# Patient Record
Sex: MALE | Race: WHITE | Employment: OTHER | URBAN - METROPOLITAN AREA
[De-identification: names, ages, dates, MRNs, and addresses within clinical notes are randomized per-mention and may not be internally consistent; named-entity substitution may affect disease eponyms.]

---

## 2016-02-02 LAB — DIABETIC RETINOPATHY: NORMAL

## 2017-07-03 LAB
ALBUMIN SERPL-MCNC: 4.3 G/DL
ALP BLD-CCNC: 60 U/L
ALT SERPL-CCNC: 38 U/L
ANION GAP SERPL CALCULATED.3IONS-SCNC: 1.6 MMOL/L
AST SERPL-CCNC: 33 U/L
AVERAGE GLUCOSE: NORMAL
BASOPHILS ABSOLUTE: NORMAL /ΜL
BASOPHILS RELATIVE PERCENT: NORMAL %
BILIRUB SERPL-MCNC: 0.7 MG/DL (ref 0.1–1.4)
BILIRUBIN, URINE: NORMAL
BLOOD, URINE: NORMAL
BUN BLDV-MCNC: 21 MG/DL
CALCIUM SERPL-MCNC: 9.6 MG/DL
CHLORIDE BLD-SCNC: 103 MMOL/L
CHOLESTEROL, TOTAL: 182 MG/DL
CHOLESTEROL/HDL RATIO: 4.3
CLARITY: NORMAL
CO2: 32 MMOL/L
COLOR: NORMAL
CREAT SERPL-MCNC: 1.17 MG/DL
EOSINOPHILS ABSOLUTE: NORMAL /ΜL
EOSINOPHILS RELATIVE PERCENT: NORMAL %
GFR CALCULATED: NORMAL
GLUCOSE BLD-MCNC: 82 MG/DL
GLUCOSE URINE: NORMAL
HBA1C MFR BLD: 5.7 %
HCT VFR BLD CALC: NORMAL % (ref 41–53)
HDLC SERPL-MCNC: 42 MG/DL (ref 35–70)
HEMOGLOBIN: NORMAL G/DL (ref 13.5–17.5)
KETONES, URINE: NORMAL
LDL CHOLESTEROL CALCULATED: 124 MG/DL (ref 0–160)
LEUKOCYTE ESTERASE, URINE: NORMAL
LYMPHOCYTES ABSOLUTE: NORMAL /ΜL
LYMPHOCYTES RELATIVE PERCENT: NORMAL %
MCH RBC QN AUTO: NORMAL PG
MCHC RBC AUTO-ENTMCNC: NORMAL G/DL
MCV RBC AUTO: NORMAL FL
MONOCYTES ABSOLUTE: NORMAL /ΜL
MONOCYTES RELATIVE PERCENT: NORMAL %
NEUTROPHILS ABSOLUTE: NORMAL /ΜL
NEUTROPHILS RELATIVE PERCENT: NORMAL %
NITRITE, URINE: NORMAL
PDW BLD-RTO: NORMAL %
PH UA: NORMAL (ref 4.5–8)
PLATELET # BLD: NORMAL K/ΜL
PMV BLD AUTO: NORMAL FL
POTASSIUM SERPL-SCNC: 5.9 MMOL/L
PROTEIN UA: NORMAL
RBC # BLD: NORMAL 10^6/ΜL
SODIUM BLD-SCNC: 142 MMOL/L
SPECIFIC GRAVITY, URINE: NORMAL
TESTOSTERONE TOTAL: 543
TOTAL PROTEIN: 6.9
TRIGL SERPL-MCNC: 81 MG/DL
TSH SERPL DL<=0.05 MIU/L-ACNC: 0.38 UIU/ML
URIC ACID: 5.6
UROBILINOGEN, URINE: NORMAL
VITAMIN D 25-HYDROXY: NORMAL
VITAMIN D2, 25 HYDROXY: NORMAL
VITAMIN D3,25 HYDROXY: NORMAL
VLDLC SERPL CALC-MCNC: NORMAL MG/DL
WBC # BLD: NORMAL 10^3/ML

## 2018-01-03 LAB
ALBUMIN SERPL-MCNC: 4.2 G/DL
ALP BLD-CCNC: 53 U/L
ALT SERPL-CCNC: 26 U/L
ANION GAP SERPL CALCULATED.3IONS-SCNC: 1.6 MMOL/L
AST SERPL-CCNC: 24 U/L
AVERAGE GLUCOSE: NORMAL
BASOPHILS ABSOLUTE: 50 /ΜL
BASOPHILS RELATIVE PERCENT: 1 %
BILIRUB SERPL-MCNC: 0.6 MG/DL (ref 0.1–1.4)
BILIRUBIN, URINE: NORMAL
BLOOD, URINE: NORMAL
BUN BLDV-MCNC: 23 MG/DL
CALCIUM SERPL-MCNC: 9.3 MG/DL
CHLORIDE BLD-SCNC: 103 MMOL/L
CHOLESTEROL, TOTAL: 219 MG/DL
CHOLESTEROL/HDL RATIO: 4.6
CLARITY: NORMAL
CO2: 28 MMOL/L
COLOR: NORMAL
CREAT SERPL-MCNC: 1.28 MG/DL
EOSINOPHILS ABSOLUTE: 590 /ΜL
EOSINOPHILS RELATIVE PERCENT: 6 %
GFR CALCULATED: NORMAL
GLUCOSE BLD-MCNC: 94 MG/DL
GLUCOSE URINE: NORMAL
HBA1C MFR BLD: 5.6 %
HCT VFR BLD CALC: 53.2 % (ref 41–53)
HDLC SERPL-MCNC: 48 MG/DL (ref 35–70)
HEMOGLOBIN: 17 G/DL (ref 13.5–17.5)
KETONES, URINE: NORMAL
LDL CHOLESTEROL CALCULATED: 146 MG/DL (ref 0–160)
LEUKOCYTE ESTERASE, URINE: NORMAL
LYMPHOCYTES ABSOLUTE: 1870 /ΜL
LYMPHOCYTES RELATIVE PERCENT: 19 %
MCH RBC QN AUTO: 29.8 PG
MCHC RBC AUTO-ENTMCNC: 32 G/DL
MCV RBC AUTO: 93.3 FL
MONOCYTES ABSOLUTE: 840 /ΜL
MONOCYTES RELATIVE PERCENT: 8 %
NEUTROPHILS ABSOLUTE: 6540 /ΜL
NEUTROPHILS RELATIVE PERCENT: 66 %
NITRITE, URINE: NORMAL
PDW BLD-RTO: 14.2 %
PH UA: NORMAL (ref 4.5–8)
PLATELET # BLD: 224 K/ΜL
PMV BLD AUTO: 9.3 FL
POTASSIUM SERPL-SCNC: 5.3 MMOL/L
PROTEIN UA: NORMAL
RBC # BLD: 5.7 10^6/ΜL
SODIUM BLD-SCNC: 139 MMOL/L
SPECIFIC GRAVITY, URINE: NORMAL
TESTOSTERONE TOTAL: 273
TOTAL PROTEIN: 6.7
TRIGL SERPL-MCNC: 128 MG/DL
TSH SERPL DL<=0.05 MIU/L-ACNC: 0.25 UIU/ML
URIC ACID: 23
UROBILINOGEN, URINE: NORMAL
VITAMIN D 25-HYDROXY: 30
VITAMIN D2, 25 HYDROXY: NORMAL
VITAMIN D3,25 HYDROXY: NORMAL
VLDLC SERPL CALC-MCNC: NORMAL MG/DL
WBC # BLD: 9.9 10^3/ML

## 2018-06-14 RX ORDER — ATOMOXETINE 40 MG/1
CAPSULE ORAL
Qty: 180 CAPSULE | Refills: 1 | Status: SHIPPED | OUTPATIENT
Start: 2018-06-14 | End: 2018-07-20 | Stop reason: SDUPTHER

## 2018-06-14 RX ORDER — CANAGLIFLOZIN 300 MG/1
TABLET, FILM COATED ORAL
Qty: 90 TABLET | Refills: 2 | Status: SHIPPED | OUTPATIENT
Start: 2018-06-14 | End: 2018-07-20 | Stop reason: SDUPTHER

## 2018-06-18 RX ORDER — LEVOTHYROXINE SODIUM 137 MCG
TABLET ORAL
Qty: 90 TABLET | Refills: 1 | Status: SHIPPED | OUTPATIENT
Start: 2018-06-18 | End: 2018-07-20 | Stop reason: SDUPTHER

## 2018-06-26 ENCOUNTER — TELEPHONE (OUTPATIENT)
Dept: PRIMARY CARE CLINIC | Age: 73
End: 2018-06-26

## 2018-07-10 DIAGNOSIS — E29.1 HYPOGONADISM IN MALE: ICD-10-CM

## 2018-07-10 DIAGNOSIS — E55.9 VITAMIN D DEFICIENCY: ICD-10-CM

## 2018-07-13 LAB
CREATININE, URINE: 172
MICROALBUMIN/CREAT 24H UR: 0.4 MG/G{CREAT}
MICROALBUMIN/CREAT UR-RTO: 2.3

## 2018-07-14 LAB
AVERAGE GLUCOSE: NORMAL
HBA1C MFR BLD: 5.9 %

## 2018-07-16 LAB
ALBUMIN SERPL-MCNC: NORMAL G/DL
ALP BLD-CCNC: NORMAL U/L
ALT SERPL-CCNC: NORMAL U/L
ANION GAP SERPL CALCULATED.3IONS-SCNC: NORMAL MMOL/L
AST SERPL-CCNC: NORMAL U/L
BILIRUB SERPL-MCNC: NORMAL MG/DL (ref 0.1–1.4)
BUN BLDV-MCNC: NORMAL MG/DL
CALCIUM SERPL-MCNC: NORMAL MG/DL
CHLORIDE BLD-SCNC: NORMAL MMOL/L
CHOLESTEROL, TOTAL: 194 MG/DL
CHOLESTEROL/HDL RATIO: 4.5
CO2: NORMAL MMOL/L
CREAT SERPL-MCNC: 1.27 MG/DL
GFR CALCULATED: NORMAL
GLUCOSE BLD-MCNC: NORMAL MG/DL
HDLC SERPL-MCNC: 43 MG/DL (ref 35–70)
LDL CHOLESTEROL CALCULATED: 129 MG/DL (ref 0–160)
POTASSIUM SERPL-SCNC: 5.8 MMOL/L
SODIUM BLD-SCNC: NORMAL MMOL/L
TESTOSTERONE TOTAL: 396
TOTAL PROTEIN: NORMAL
TRIGL SERPL-MCNC: 114 MG/DL
TSH SERPL DL<=0.05 MIU/L-ACNC: 0.16 UIU/ML
VITAMIN D 25-HYDROXY: 36
VITAMIN D2, 25 HYDROXY: NORMAL
VITAMIN D3,25 HYDROXY: NORMAL
VLDLC SERPL CALC-MCNC: NORMAL MG/DL

## 2018-07-20 ENCOUNTER — OFFICE VISIT (OUTPATIENT)
Dept: PRIMARY CARE CLINIC | Age: 73
End: 2018-07-20
Payer: MEDICARE

## 2018-07-20 VITALS
RESPIRATION RATE: 18 BRPM | HEART RATE: 86 BPM | OXYGEN SATURATION: 93 % | SYSTOLIC BLOOD PRESSURE: 112 MMHG | DIASTOLIC BLOOD PRESSURE: 72 MMHG | WEIGHT: 189 LBS | HEIGHT: 72 IN | BODY MASS INDEX: 25.6 KG/M2

## 2018-07-20 DIAGNOSIS — Z80.51 FHX: RENAL CELL CARCINOMA: ICD-10-CM

## 2018-07-20 DIAGNOSIS — Z83.3 FHX: DIABETES MELLITUS: ICD-10-CM

## 2018-07-20 DIAGNOSIS — Z83.49 FHX: THYROID DISEASE: ICD-10-CM

## 2018-07-20 DIAGNOSIS — E87.5 HYPERKALEMIA: ICD-10-CM

## 2018-07-20 DIAGNOSIS — D75.1 POLYCYTHEMIA: ICD-10-CM

## 2018-07-20 DIAGNOSIS — R73.9 HYPERGLYCEMIA: ICD-10-CM

## 2018-07-20 DIAGNOSIS — E29.1 HYPOGONADISM IN MALE: ICD-10-CM

## 2018-07-20 DIAGNOSIS — E88.81 METABOLIC SYNDROME: ICD-10-CM

## 2018-07-20 DIAGNOSIS — I10 ESSENTIAL HYPERTENSION: ICD-10-CM

## 2018-07-20 DIAGNOSIS — K21.00 REFLUX ESOPHAGITIS: ICD-10-CM

## 2018-07-20 DIAGNOSIS — F90.0 ATTENTION DEFICIT HYPERACTIVITY DISORDER (ADHD), PREDOMINANTLY INATTENTIVE TYPE: ICD-10-CM

## 2018-07-20 DIAGNOSIS — Z00.00 ROUTINE GENERAL MEDICAL EXAMINATION AT A HEALTH CARE FACILITY: Primary | ICD-10-CM

## 2018-07-20 DIAGNOSIS — E78.3 HYPERCHYLOMICRONEMIA: ICD-10-CM

## 2018-07-20 DIAGNOSIS — E03.9 ACQUIRED HYPOTHYROIDISM: ICD-10-CM

## 2018-07-20 DIAGNOSIS — E55.9 VITAMIN D DEFICIENCY: ICD-10-CM

## 2018-07-20 PROBLEM — E88.810 METABOLIC SYNDROME: Status: ACTIVE | Noted: 2018-07-20

## 2018-07-20 PROCEDURE — G0439 PPPS, SUBSEQ VISIT: HCPCS | Performed by: INTERNAL MEDICINE

## 2018-07-20 RX ORDER — LOVASTATIN 20 MG/1
20 TABLET ORAL NIGHTLY
Qty: 90 TABLET | Refills: 1 | Status: SHIPPED | OUTPATIENT
Start: 2018-07-20 | End: 2019-06-25 | Stop reason: SDUPTHER

## 2018-07-20 RX ORDER — TESTOSTERONE 16.2 MG/G
2 GEL TRANSDERMAL
COMMUNITY
Start: 2018-06-16 | End: 2018-07-20 | Stop reason: SDUPTHER

## 2018-07-20 RX ORDER — LEVOTHYROXINE SODIUM 137 MCG
137 TABLET ORAL DAILY
Qty: 90 TABLET | Refills: 1 | Status: SHIPPED | OUTPATIENT
Start: 2018-07-20 | End: 2019-02-07 | Stop reason: SDUPTHER

## 2018-07-20 RX ORDER — PANTOPRAZOLE SODIUM 40 MG/1
TABLET, DELAYED RELEASE ORAL
COMMUNITY
Start: 2018-04-18 | End: 2018-07-20 | Stop reason: SDUPTHER

## 2018-07-20 RX ORDER — TESTOSTERONE 16.2 MG/G
2 GEL TRANSDERMAL DAILY
Qty: 4 BOTTLE | Refills: 1 | Status: SHIPPED | OUTPATIENT
Start: 2018-07-20 | End: 2019-01-15 | Stop reason: SDUPTHER

## 2018-07-20 RX ORDER — LOSARTAN POTASSIUM 50 MG/1
50 TABLET ORAL DAILY
Qty: 90 TABLET | Refills: 2 | Status: SHIPPED | OUTPATIENT
Start: 2018-07-20 | End: 2019-03-12 | Stop reason: SDUPTHER

## 2018-07-20 RX ORDER — PANTOPRAZOLE SODIUM 40 MG/1
TABLET, DELAYED RELEASE ORAL
Qty: 30 TABLET | Refills: 2 | Status: SHIPPED | OUTPATIENT
Start: 2018-07-20 | End: 2019-06-25 | Stop reason: SDUPTHER

## 2018-07-20 RX ORDER — ATOMOXETINE 40 MG/1
40 CAPSULE ORAL 2 TIMES DAILY
Qty: 180 CAPSULE | Refills: 1 | Status: SHIPPED | OUTPATIENT
Start: 2018-07-20 | End: 2019-05-07 | Stop reason: SDUPTHER

## 2018-07-20 ASSESSMENT — LIFESTYLE VARIABLES
HOW OFTEN DURING THE LAST YEAR HAVE YOU NEEDED AN ALCOHOLIC DRINK FIRST THING IN THE MORNING TO GET YOURSELF GOING AFTER A NIGHT OF HEAVY DRINKING: 0
HAS A RELATIVE, FRIEND, DOCTOR, OR ANOTHER HEALTH PROFESSIONAL EXPRESSED CONCERN ABOUT YOUR DRINKING OR SUGGESTED YOU CUT DOWN: 0
HOW OFTEN DURING THE LAST YEAR HAVE YOU HAD A FEELING OF GUILT OR REMORSE AFTER DRINKING: 0
HOW OFTEN DURING THE LAST YEAR HAVE YOU FOUND THAT YOU WERE NOT ABLE TO STOP DRINKING ONCE YOU HAD STARTED: 0
HOW OFTEN DO YOU HAVE A DRINK CONTAINING ALCOHOL: 1
HOW OFTEN DO YOU HAVE SIX OR MORE DRINKS ON ONE OCCASION: 0
AUDIT-C TOTAL SCORE: 1
HOW OFTEN DURING THE LAST YEAR HAVE YOU BEEN UNABLE TO REMEMBER WHAT HAPPENED THE NIGHT BEFORE BECAUSE YOU HAD BEEN DRINKING: 0
AUDIT TOTAL SCORE: 1
HOW MANY STANDARD DRINKS CONTAINING ALCOHOL DO YOU HAVE ON A TYPICAL DAY: 0
HAVE YOU OR SOMEONE ELSE BEEN INJURED AS A RESULT OF YOUR DRINKING: 0
HOW OFTEN DURING THE LAST YEAR HAVE YOU FAILED TO DO WHAT WAS NORMALLY EXPECTED FROM YOU BECAUSE OF DRINKING: 0

## 2018-07-20 ASSESSMENT — ANXIETY QUESTIONNAIRES: GAD7 TOTAL SCORE: 2

## 2018-07-20 ASSESSMENT — PATIENT HEALTH QUESTIONNAIRE - PHQ9: SUM OF ALL RESPONSES TO PHQ QUESTIONS 1-9: 2

## 2018-07-20 NOTE — PROGRESS NOTES
daily. Yes Historical Provider, MD   omeprazole (PRILOSEC) 20 MG capsule Take 20 mg by mouth daily. Historical Provider, MD     Past Medical History:   Diagnosis Date    Family history of renal cell carcinoma     Hypogonadism in male     Hypothyroidism     Type 2 diabetes mellitus without complication ([facility] [address]     Vitamin D deficiency      No past surgical history on file. No family history on file.     CareTeam (Including outside providers/suppliers regularly involved in providing care):   Patient Care Team:  Anabella Garcia MD as PCP - General (Internal Medicine)    Wt Readings from Last 3 Encounters:   07/20/18 189 lb (85.7 kg)     Vitals:    07/20/18 0938   BP: 112/72   Site: Left Arm   Position: Sitting   Cuff Size: Large Adult   Pulse: 86   Resp: 18   SpO2: 93%   Weight: 189 lb (85.7 kg)   Height: 5' 11.5\" (1.816 m)       General Appearance: alert and oriented to person, place and time, well developed and well- nourished, in no acute distress  Skin: warm and dry, no rash or erythema  Head: normocephalic and atraumatic  Eyes: pupils equal, round, and reactive to light, extraocular eye movements intact, conjunctivae normal  ENT: tympanic membrane, external ear and ear canal normal bilaterally, nose without deformity, nasal mucosa and turbinates normal without polyps  Neck: supple and non-tender without mass, no thyromegaly or thyroid nodules, no cervical lymphadenopathy  Pulmonary/Chest: clear to auscultation bilaterally- no wheezes, rales or rhonchi, normal air movement, no respiratory distress  Cardiovascular: normal rate, regular rhythm, normal S1 and S2, no murmurs, rubs, clicks, or gallops, distal pulses intact, no carotid bruits  Abdomen: soft, non-tender, non-distended, normal bowel sounds, no masses or organomegaly  Extremities: no cyanosis, clubbing or edema  Musculoskeletal: normal range of motion, no joint swelling, deformity or tenderness  Neurologic: reflexes normal and symmetric, no cranial

## 2018-07-28 LAB
BUN BLDV-MCNC: 20 MG/DL
CALCIUM SERPL-MCNC: 9.5 MG/DL
CHLORIDE BLD-SCNC: 104 MMOL/L
CO2: 29 MMOL/L
CREAT SERPL-MCNC: 1.16 MG/DL
GFR CALCULATED: NORMAL
GLUCOSE BLD-MCNC: 111 MG/DL
POTASSIUM SERPL-SCNC: 4.8 MMOL/L
SODIUM BLD-SCNC: 139 MMOL/L

## 2018-07-30 ENCOUNTER — TELEPHONE (OUTPATIENT)
Dept: PRIMARY CARE CLINIC | Age: 73
End: 2018-07-30

## 2018-07-30 DIAGNOSIS — E87.5 HYPERKALEMIA: ICD-10-CM

## 2018-07-31 ENCOUNTER — TELEPHONE (OUTPATIENT)
Dept: PRIMARY CARE CLINIC | Age: 73
End: 2018-07-31

## 2018-07-31 DIAGNOSIS — E29.1 HYPOGONADISM IN MALE: Primary | ICD-10-CM

## 2018-10-17 LAB
ALBUMIN SERPL-MCNC: 4.3 G/DL
ALP BLD-CCNC: 57 U/L
ALT SERPL-CCNC: 25 U/L
ANION GAP SERPL CALCULATED.3IONS-SCNC: NORMAL MMOL/L
AST SERPL-CCNC: 23 U/L
AVERAGE GLUCOSE: NORMAL
BASOPHILS ABSOLUTE: 60 /ΜL
BASOPHILS RELATIVE PERCENT: 1 %
BILIRUB SERPL-MCNC: 0.7 MG/DL (ref 0.1–1.4)
BILIRUBIN, URINE: NEGATIVE
BLOOD, URINE: NEGATIVE
BUN BLDV-MCNC: 17 MG/DL
CALCIUM SERPL-MCNC: 9.2 MG/DL
CHLORIDE BLD-SCNC: 105 MMOL/L
CHOLESTEROL, TOTAL: 174 MG/DL
CHOLESTEROL/HDL RATIO: 4
CLARITY: CLEAR
CO2: 30 MMOL/L
COLOR: YELLOW
CREAT SERPL-MCNC: 1.08 MG/DL
EOSINOPHILS ABSOLUTE: 570 /ΜL
EOSINOPHILS RELATIVE PERCENT: 7 %
GFR CALCULATED: NORMAL
GLUCOSE BLD-MCNC: 85 MG/DL
GLUCOSE URINE: NORMAL
HBA1C MFR BLD: 6 %
HCT VFR BLD CALC: 51 % (ref 41–53)
HDLC SERPL-MCNC: 44 MG/DL (ref 35–70)
HEMOGLOBIN: 16.2 G/DL (ref 13.5–17.5)
KETONES, URINE: NEGATIVE
LDL CHOLESTEROL CALCULATED: 112 MG/DL (ref 0–160)
LEUKOCYTE ESTERASE, URINE: NEGATIVE
LYMPHOCYTES ABSOLUTE: 1810 /ΜL
LYMPHOCYTES RELATIVE PERCENT: 23 %
MCH RBC QN AUTO: 27.5 PG
MCHC RBC AUTO-ENTMCNC: 31.9 G/DL
MCV RBC AUTO: 86.4 FL
MONOCYTES ABSOLUTE: 570 /ΜL
MONOCYTES RELATIVE PERCENT: 7 %
NEUTROPHILS ABSOLUTE: 4810 /ΜL
NEUTROPHILS RELATIVE PERCENT: 62 %
NITRITE, URINE: NEGATIVE
PDW BLD-RTO: 18.1 %
PH UA: 7 (ref 4.5–8)
PLATELET # BLD: 237 K/ΜL
PMV BLD AUTO: 10.5 FL
POTASSIUM SERPL-SCNC: 5 MMOL/L
PROSTATE SPECIFIC ANTIGEN: 0.7 NG/ML
PROTEIN UA: NEGATIVE
RBC # BLD: 5.9 10^6/ΜL
SODIUM BLD-SCNC: 140 MMOL/L
SPECIFIC GRAVITY, URINE: 1.03
TESTOSTERONE TOTAL: 692
TOTAL PROTEIN: 6.9
TRIGL SERPL-MCNC: 81 MG/DL
TSH SERPL DL<=0.05 MIU/L-ACNC: 0.56 UIU/ML
UROBILINOGEN, URINE: NORMAL
VLDLC SERPL CALC-MCNC: NORMAL MG/DL
WBC # BLD: 7.8 10^3/ML

## 2018-10-31 ENCOUNTER — OFFICE VISIT (OUTPATIENT)
Dept: PRIMARY CARE CLINIC | Age: 73
End: 2018-10-31
Payer: MEDICARE

## 2018-10-31 VITALS
HEIGHT: 71 IN | BODY MASS INDEX: 26.46 KG/M2 | HEART RATE: 88 BPM | RESPIRATION RATE: 18 BRPM | OXYGEN SATURATION: 98 % | SYSTOLIC BLOOD PRESSURE: 132 MMHG | TEMPERATURE: 98.6 F | WEIGHT: 189 LBS | DIASTOLIC BLOOD PRESSURE: 76 MMHG

## 2018-10-31 DIAGNOSIS — E55.9 VITAMIN D DEFICIENCY: ICD-10-CM

## 2018-10-31 DIAGNOSIS — E03.9 ACQUIRED HYPOTHYROIDISM: ICD-10-CM

## 2018-10-31 DIAGNOSIS — Z83.3 FHX: DIABETES MELLITUS: ICD-10-CM

## 2018-10-31 DIAGNOSIS — E11.9 TYPE 2 DIABETES MELLITUS WITHOUT COMPLICATION, WITHOUT LONG-TERM CURRENT USE OF INSULIN (HCC): Primary | ICD-10-CM

## 2018-10-31 DIAGNOSIS — Z80.51 FHX: RENAL CELL CARCINOMA: ICD-10-CM

## 2018-10-31 DIAGNOSIS — E78.3 HYPERCHYLOMICRONEMIA: ICD-10-CM

## 2018-10-31 DIAGNOSIS — E29.1 HYPOGONADISM IN MALE: ICD-10-CM

## 2018-10-31 DIAGNOSIS — D75.1 POLYCYTHEMIA: ICD-10-CM

## 2018-10-31 DIAGNOSIS — I10 ESSENTIAL HYPERTENSION: ICD-10-CM

## 2018-10-31 DIAGNOSIS — K21.00 REFLUX ESOPHAGITIS: ICD-10-CM

## 2018-10-31 DIAGNOSIS — Z83.49 FHX: THYROID DISEASE: ICD-10-CM

## 2018-10-31 PROCEDURE — 99214 OFFICE O/P EST MOD 30 MIN: CPT | Performed by: INTERNAL MEDICINE

## 2018-10-31 RX ORDER — INFLUENZA VACCINE, ADJUVANTED 15; 15; 15 UG/.5ML; UG/.5ML; UG/.5ML
INJECTION, SUSPENSION INTRAMUSCULAR
Refills: 0 | COMMUNITY
Start: 2018-08-31 | End: 2020-06-24 | Stop reason: ALTCHOICE

## 2018-10-31 ASSESSMENT — ENCOUNTER SYMPTOMS
STRIDOR: 0
DIARRHEA: 0
EYE REDNESS: 0
BLOOD IN STOOL: 0
SHORTNESS OF BREATH: 0
NAUSEA: 0
EYE PAIN: 0

## 2018-10-31 NOTE — PROGRESS NOTES
HPI:       This is a relatively short-term follow-up on this patient. He was last in the office to be seen on July 20, 2018    Chief complaint reason for this visit to follow up after the patient's encounter with hematologist Dr. Deyanira Toro, and Urologist Dr. Angelica Thacker, both of these physicians saw the patient regarding his use of testosterone, and the contingent potential complications including hematologically the elevation of his red blood count climbing phlebotomy. Also effects of testosterone on the patient's prostate and urological system. Urology felt that there was no significant findings on examination, his prostate examination is essentially normal.  And they recommended that he could take testosterone replacement. The patient will be following up with the same urologist but not for one year    She is seen a new hematologist, Dr. Deyanira Toro, regarding his polycythemia, when he saw on the first time on 9/25/18 he did recommend phlebotomy. Also, he noted that the patient had been seeing another hematologist about pulmonary nodules. A CT scan of the chest had been done on 1/17/18, and showed multiple small pulmonary nodules. The previous hematologist had no plan to follow it up. I believe that the new hematologist , Dr. Pat Cabrera, will follow this up closely. I expressed that concern the hematologist follow this up with the patient in great detail. I also said O2 the patient about the use of testosterone at age 72 and the ongoing need for phlebotomy which is occurring iatrogenically. My recommendation would be that he actually discontinue the testosterone. I believe this should be done with recommendations made by urology and/or hematology or both. I have no plans see this patient back for 6 months, which has been the usual interval seeing the patient since he is now seeing hematology on a regular basis.   I assume that hematology will be checking his testosterone level as well as his blood November 21. It's possible by that time that the blood count can be higher and the patient may require another phlebotomy? It's notable that the patient is also continuing to use Strattera twice daily. He is also using Invokana 300mg as a agent to control his blood glucose. There may be a compelling reason to switch him to [place], since surgeon is proven to be more cardioprotective. As for the use of Strattera, and a gentleman age 72, at the question that may require psychiatric intervention and evaluation. Plan;  1. For the time being he'll continue his current medication  2. Follow-up with hematology ,Dr. Jolly Driver, await the outcome of his evaluation of his blood count and testosterone level  3. Follow-up with urology in one year's time  4. Give consideration to slowly withdrawing testosterone? 5.  Hematology to monitor the patient's abnormal CT scan of the chest with multiple pulmonary nodules  6. Consider switching patient from Invokana, to Jardiance 25mg daily  7. Consider how much longer the patient should be taking Strattera 40 mg twice daily? 8. He reviewed in detail with the patient need for immunization including influenza, pneumonia, tetanus, shingles vaccine  9. Review last time patient had colonoscopy, as well as follow-up plan    Diagnoses and all orders for this visit:    Type 2 diabetes mellitus without complication, without long-term current use of insulin ([address] continue current medication,Invokana, but may switch the patient to Jardiance  -     Hemoglobin A1C; Future    Hypogonadism in male;he will follow-up with hematology, await outcome of next blood count and testosterone level  -     TESTOSTERONE; Future    Acquired hypothyroidism;continue replacement medication    Vitamin D deficiency;monitor vitamin D  -     Vitamin D 25 Hydroxy;  Future    Polycythemia;after blood count with hematology  -     CBC with Differential; Future    Essential hypertension;pressure currently

## 2018-11-01 DIAGNOSIS — D75.1 POLYCYTHEMIA: ICD-10-CM

## 2018-11-01 DIAGNOSIS — E29.1 HYPOGONADISM IN MALE: ICD-10-CM

## 2018-11-01 DIAGNOSIS — E03.9 ACQUIRED HYPOTHYROIDISM: ICD-10-CM

## 2018-11-01 DIAGNOSIS — R73.9 HYPERGLYCEMIA: ICD-10-CM

## 2018-11-01 DIAGNOSIS — E78.3 HYPERCHYLOMICRONEMIA: ICD-10-CM

## 2018-11-01 DIAGNOSIS — E88.81 METABOLIC SYNDROME: ICD-10-CM

## 2018-11-01 DIAGNOSIS — I10 ESSENTIAL HYPERTENSION: ICD-10-CM

## 2018-11-01 PROCEDURE — 81003 URINALYSIS AUTO W/O SCOPE: CPT | Performed by: INTERNAL MEDICINE

## 2019-01-15 DIAGNOSIS — E29.1 HYPOGONADISM IN MALE: ICD-10-CM

## 2019-01-15 RX ORDER — TESTOSTERONE 16.2 MG/G
2 GEL TRANSDERMAL DAILY
Qty: 4 BOTTLE | Refills: 0 | OUTPATIENT
Start: 2019-01-15 | End: 2019-01-15 | Stop reason: SDUPTHER

## 2019-01-15 RX ORDER — TESTOSTERONE 16.2 MG/G
2 GEL TRANSDERMAL DAILY
Qty: 4 BOTTLE | Refills: 0 | Status: SHIPPED | OUTPATIENT
Start: 2019-01-15 | End: 2019-06-17 | Stop reason: SDUPTHER

## 2019-02-07 DIAGNOSIS — E03.9 ACQUIRED HYPOTHYROIDISM: ICD-10-CM

## 2019-02-07 RX ORDER — LEVOTHYROXINE SODIUM 137 MCG
TABLET ORAL
Qty: 90 TABLET | Refills: 1 | Status: SHIPPED | OUTPATIENT
Start: 2019-02-07 | End: 2019-06-25 | Stop reason: DRUGHIGH

## 2019-05-07 DIAGNOSIS — F90.0 ATTENTION DEFICIT HYPERACTIVITY DISORDER (ADHD), PREDOMINANTLY INATTENTIVE TYPE: ICD-10-CM

## 2019-05-07 RX ORDER — ATOMOXETINE 40 MG/1
CAPSULE ORAL
Qty: 180 CAPSULE | Refills: 0 | Status: SHIPPED | OUTPATIENT
Start: 2019-05-07 | End: 2019-06-25 | Stop reason: SDUPTHER

## 2019-06-06 ENCOUNTER — TELEPHONE (OUTPATIENT)
Dept: PRIMARY CARE CLINIC | Age: 74
End: 2019-06-06
Payer: MEDICARE

## 2019-06-06 DIAGNOSIS — Z79.899 ENCOUNTER FOR LONG-TERM (CURRENT) USE OF MEDICATIONS: ICD-10-CM

## 2019-06-06 DIAGNOSIS — E29.1 MALE HYPOGONADISM: ICD-10-CM

## 2019-06-06 DIAGNOSIS — I10 ESSENTIAL HYPERTENSION, BENIGN: ICD-10-CM

## 2019-06-06 DIAGNOSIS — E11.8 TYPE 2 DIABETES MELLITUS WITH COMPLICATION, WITHOUT LONG-TERM CURRENT USE OF INSULIN (HCC): ICD-10-CM

## 2019-06-06 DIAGNOSIS — K21.00 REFLUX ESOPHAGITIS: ICD-10-CM

## 2019-06-06 DIAGNOSIS — E03.9 ACQUIRED HYPOTHYROIDISM: ICD-10-CM

## 2019-06-06 DIAGNOSIS — E55.9 VITAMIN D DEFICIENCY: ICD-10-CM

## 2019-06-06 DIAGNOSIS — E78.3 HYPERCHYLOMICRONEMIA: Primary | ICD-10-CM

## 2019-06-06 PROCEDURE — 81003 URINALYSIS AUTO W/O SCOPE: CPT | Performed by: FAMILY MEDICINE

## 2019-06-14 LAB
ALBUMIN SERPL-MCNC: 4.4 G/DL
ALP BLD-CCNC: 59 U/L
ALT SERPL-CCNC: 24 U/L
ANION GAP SERPL CALCULATED.3IONS-SCNC: NORMAL MMOL/L
AST SERPL-CCNC: 24 U/L
AVERAGE GLUCOSE: NORMAL
BASOPHILS ABSOLUTE: 57 /ΜL
BASOPHILS RELATIVE PERCENT: 0.7 %
BILIRUB SERPL-MCNC: 0.8 MG/DL (ref 0.1–1.4)
BILIRUBIN, URINE: NEGATIVE
BLOOD, URINE: NEGATIVE
BUN BLDV-MCNC: 13 MG/DL
CALCIUM SERPL-MCNC: 9.6 MG/DL
CHLORIDE BLD-SCNC: 103 MMOL/L
CHOLESTEROL, TOTAL: 197 MG/DL
CHOLESTEROL/HDL RATIO: 4
CLARITY: CLEAR
CO2: 31 MMOL/L
COLOR: YELLOW
CREAT SERPL-MCNC: 1.17 MG/DL
EOSINOPHILS ABSOLUTE: 567 /ΜL
EOSINOPHILS RELATIVE PERCENT: 7 %
GFR CALCULATED: NORMAL
GLUCOSE BLD-MCNC: 90 MG/DL
GLUCOSE URINE: NORMAL
HBA1C MFR BLD: 6.1 %
HCT VFR BLD CALC: 51.4 % (ref 41–53)
HDLC SERPL-MCNC: 49 MG/DL (ref 35–70)
HEMOGLOBIN: 16.5 G/DL (ref 13.5–17.5)
KETONES, URINE: NEGATIVE
LDL CHOLESTEROL CALCULATED: 125 MG/DL (ref 0–160)
LEUKOCYTE ESTERASE, URINE: NEGATIVE
LYMPHOCYTES ABSOLUTE: 1661 /ΜL
LYMPHOCYTES RELATIVE PERCENT: 20.5 %
MCH RBC QN AUTO: 26.4 PG
MCHC RBC AUTO-ENTMCNC: 32 G/DL
MCV RBC AUTO: 82.4 FL
MONOCYTES ABSOLUTE: 713 /ΜL
MONOCYTES RELATIVE PERCENT: 8.8 %
NEUTROPHILS ABSOLUTE: 5103 /ΜL
NEUTROPHILS RELATIVE PERCENT: 63 %
NITRITE, URINE: NEGATIVE
PDW BLD-RTO: 18.3 %
PH UA: 7 (ref 4.5–8)
PLATELET # BLD: NORMAL K/ΜL
PMV BLD AUTO: 9.6 FL
POTASSIUM SERPL-SCNC: 4.8 MMOL/L
PROTEIN UA: NEGATIVE
RBC # BLD: 6.24 10^6/ΜL
SODIUM BLD-SCNC: 140 MMOL/L
SPECIFIC GRAVITY, URINE: 1.02
TESTOSTERONE TOTAL: 413
TOTAL PROTEIN: 7.3
TRIGL SERPL-MCNC: 120 MG/DL
TSH SERPL DL<=0.05 MIU/L-ACNC: 0.22 UIU/ML
URIC ACID: 5
URIC ACID: 5
UROBILINOGEN, URINE: NORMAL
VITAMIN D 25-HYDROXY: 27
VITAMIN D2, 25 HYDROXY: NORMAL
VITAMIN D3,25 HYDROXY: NORMAL
VLDLC SERPL CALC-MCNC: NORMAL MG/DL
WBC # BLD: 8.1 10^3/ML

## 2019-06-17 DIAGNOSIS — E29.1 HYPOGONADISM IN MALE: ICD-10-CM

## 2019-06-17 RX ORDER — TESTOSTERONE 16.2 MG/G
2 GEL TRANSDERMAL DAILY
Qty: 4 BOTTLE | Refills: 0 | Status: SHIPPED | OUTPATIENT
Start: 2019-06-17 | End: 2019-12-18

## 2019-06-25 ENCOUNTER — OFFICE VISIT (OUTPATIENT)
Dept: PRIMARY CARE CLINIC | Age: 74
End: 2019-06-25
Payer: MEDICARE

## 2019-06-25 VITALS
HEIGHT: 71 IN | TEMPERATURE: 97.9 F | DIASTOLIC BLOOD PRESSURE: 72 MMHG | RESPIRATION RATE: 18 BRPM | BODY MASS INDEX: 26.6 KG/M2 | WEIGHT: 190 LBS | SYSTOLIC BLOOD PRESSURE: 134 MMHG

## 2019-06-25 DIAGNOSIS — E03.9 ACQUIRED HYPOTHYROIDISM: ICD-10-CM

## 2019-06-25 DIAGNOSIS — E78.3 HYPERCHYLOMICRONEMIA: ICD-10-CM

## 2019-06-25 DIAGNOSIS — E29.1 HYPOGONADISM IN MALE: ICD-10-CM

## 2019-06-25 DIAGNOSIS — D75.1 POLYCYTHEMIA: ICD-10-CM

## 2019-06-25 DIAGNOSIS — K21.00 REFLUX ESOPHAGITIS: ICD-10-CM

## 2019-06-25 DIAGNOSIS — E88.81 METABOLIC SYNDROME: ICD-10-CM

## 2019-06-25 DIAGNOSIS — I10 ESSENTIAL HYPERTENSION: Primary | ICD-10-CM

## 2019-06-25 DIAGNOSIS — F90.0 ATTENTION DEFICIT HYPERACTIVITY DISORDER (ADHD), PREDOMINANTLY INATTENTIVE TYPE: ICD-10-CM

## 2019-06-25 PROCEDURE — 99214 OFFICE O/P EST MOD 30 MIN: CPT | Performed by: FAMILY MEDICINE

## 2019-06-25 RX ORDER — LEVOTHYROXINE SODIUM 137 MCG
TABLET ORAL
Qty: 90 TABLET | Refills: 1 | Status: CANCELLED | OUTPATIENT
Start: 2019-06-25

## 2019-06-25 RX ORDER — LEVOTHYROXINE SODIUM 100 MCG
100 TABLET ORAL DAILY
Qty: 90 TABLET | Refills: 1
Start: 2019-06-25 | End: 2019-06-25 | Stop reason: SDUPTHER

## 2019-06-25 RX ORDER — TESTOSTERONE 16.2 MG/G
2 GEL TRANSDERMAL DAILY
Qty: 4 BOTTLE | Refills: 0 | Status: CANCELLED | OUTPATIENT
Start: 2019-06-25 | End: 2019-09-23

## 2019-06-25 RX ORDER — PANTOPRAZOLE SODIUM 40 MG/1
40 TABLET, DELAYED RELEASE ORAL
Qty: 36 TABLET | Refills: 2 | Status: SHIPPED | OUTPATIENT
Start: 2019-06-26 | End: 2019-06-27 | Stop reason: SDUPTHER

## 2019-06-25 RX ORDER — ATOMOXETINE 40 MG/1
CAPSULE ORAL
Qty: 180 CAPSULE | Refills: 0 | Status: SHIPPED | OUTPATIENT
Start: 2019-06-25 | End: 2019-09-23 | Stop reason: SDUPTHER

## 2019-06-25 RX ORDER — LEVOTHYROXINE SODIUM 100 MCG
100 TABLET ORAL DAILY
Qty: 90 TABLET | Refills: 1 | Status: SHIPPED | OUTPATIENT
Start: 2019-06-25 | End: 2019-08-13 | Stop reason: SDUPTHER

## 2019-06-25 RX ORDER — LOVASTATIN 40 MG/1
40 TABLET ORAL NIGHTLY
Qty: 90 TABLET | Refills: 1 | Status: SHIPPED | OUTPATIENT
Start: 2019-06-25 | End: 2019-09-23 | Stop reason: SDUPTHER

## 2019-06-25 RX ORDER — LOSARTAN POTASSIUM 50 MG/1
TABLET ORAL
Qty: 90 TABLET | Refills: 1 | Status: SHIPPED | OUTPATIENT
Start: 2019-06-25 | End: 2019-09-23 | Stop reason: SDUPTHER

## 2019-06-25 ASSESSMENT — PATIENT HEALTH QUESTIONNAIRE - PHQ9
SUM OF ALL RESPONSES TO PHQ QUESTIONS 1-9: 0
2. FEELING DOWN, DEPRESSED OR HOPELESS: 0
SUM OF ALL RESPONSES TO PHQ9 QUESTIONS 1 & 2: 0
1. LITTLE INTEREST OR PLEASURE IN DOING THINGS: 0
SUM OF ALL RESPONSES TO PHQ QUESTIONS 1-9: 0

## 2019-06-25 NOTE — PROGRESS NOTES
Subjective:  72 y.o. y/o male is here for follow up chronic hypertension and reestablish care within this office. This is  generally controlled on current medication regimen. Takes meds as directed and tolerates them well. Most recent labs reviewed with patient and are remarkable--cholesterol and Vit D. No symptoms from htn standpoint per ROS. Patient is  compliant with lifestyle modifications. Patient does not smoke. Comorbid conditions include below. Urology: Renal scan done and unremarkable, brother  of [facility], Dr. Katharine Smallwood now (previously Dr. Gypsy Harrington), weaning down on the testosterone, Dr. Robbin Richmond had agreed to take over testosterone scripts and prostate surveillance, feels better on testosterone (taking/using for many years)  Hematology: Dr. 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 Medications and allergies also reviewed and updated in chart.         Review of Systems:  Constitutional:  No fever, no fatigue, no chills, no headaches, no weight change  Dermatology:  No rash, no mole, no dry or sensitive skin  ENT:  No cough, no sore throat, no sinus pain, no runny nose, no ear pain, +itchy ears (drops helped)   Cardiology:  No chest pain, no palpitations, no leg edema, no shortness of breath, no PND  Gastroenterology:  No dysphagia, no abdominal pain, no nausea, no vomiting, no constipation, no diarrhea, no heartburn  Musculoskeletal:  No joint pain, no leg cramps, no back pain, no muscle aches  Neuro:  No dizziness, no numbness/tingling  Respiratory:  No shortness of breath, no orthopnea, no wheezing, no TAPIA  Urology:  No blood in the urine, no urinary frequency, no urinary incontinence, no urinary urgency, no nocturia, no dysuria    Vitals:    06/25/19 0827   BP: 134/72   Site: Left Upper Arm   Position: Sitting   Cuff Size: Large Adult   Resp: 18   Temp: 97.9 °F (36.6 °C)   TempSrc: Tympanic   Weight: 190 lb (86.2 kg)   Height: 5' 11\" (1.803 m)     Body mass index is 26.5 kg/m². General:  Patient alert and oriented x 3, NAD, pleasant, normal-appearing weight  HEENT:  Atraumatic, normocephalic, pupils equal and normal, EOMI, clear conjunctiva, nose-clear, throat - no erythema  Neck:  Supple, no goiter, no carotid bruits, no LAD  Lungs:  CTA B, symmetric breath sounds, no resp distress  Heart:  RRR, no murmurs, gallops or rubs  Abdomen:  Soft/nt/nd, + bowel sounds  Extremities:  No clubbing, cyanosis or edema  Skin: unremarkable    Assessment/Plan:      Deb Ayala was seen today for hypertension, diabetes, thyroid problem, gout, medication refill and health maintenance. Diagnoses and all orders for this visit:    Essential hypertension, controlled  -     losartan (COZAAR) 50 MG tablet; TAKE 1 TABLET DAILY  + Continue current medication(s) along with dietary and lifestyle modifications. Acquired hypothyroidism, no symptoms  -     SYNTHROID 100 MCG tablet; Take 1 tablet by mouth Daily  -     TSH without Reflex; Future  - Switch to daily dosage vs 5 days per week (2 mcg per day difference on average)  - Recheck lab in 6 weeks after starting the new dosing. Hyperchylomicronemia, not at goal  -     lovastatin (MEVACOR) 40 MG tablet;  Take 1 tablet by mouth nightly  - Increase Lovastatin to 40mg nighly.  + Dietary and lifestyle modifications    Reflux esophagitis, controlled, continue  -     pantoprazole (PROTONIX) 40 MG tablet; Take 1 tablet by mouth three times a week Take one daily as needed    Attention deficit hyperactivity disorder (ADHD), predominantly inattentive type  -     atomoxetine (STRATTERA) 40 MG capsule; TAKE 1 CAPSULE TWICE A DAY  - Will continue for now since working. Metabolic syndrome  -     canagliflozin (INVOKANA) 300 MG TABS tablet; Take 1 tablet by mouth daily  - Question DM diagnosis, Hgb A1C controlled and below DM criteria    Hypogonadism in male  Will defer testosterone refills to urology. PSA surveillance    Polycythemia, secondary to androgens  Patient to call Dr. Hannah Perrin about most recent Hgb/hct to inquire if phlebotomy reqd. Continue regular follow with Dr. Hannah Perrin. Plan for labs every 6 months unless indicated otherwise. As above. Call or go to ED immediately if symptoms worsen or persist.  Return in about 3 months (around 9/25/2019) for AWV., or sooner if necessary. Educational materials and/or home exercises printed for patient's review and were included in patient instructions on his/her After Visit Summary and given to patient at the end of visit. Counseled regarding above diagnosis, including possible risks and complications,  especially if left uncontrolled. Counseled regarding the possible side effects, risks, benefits and alternatives to treatment; patient and/or guardian verbalizes understanding, agrees, feels comfortable with and wishes to proceed with above treatment plan. Advised patient to call with any new medication issues, and read all Rx info from pharmacy to assure aware of all possible risks and side effects of medication before taking. Reviewed age and gender appropriate health screening exams and vaccinations.   Advised patient regarding importance of keeping up with recommended health maintenance and to schedule as soon as possible if overdue, as this is important in assessing for undiagnosed pathology, especially cancer, as well as protecting against potentially harmful/life threatening disease. Patient and/or guardian verbalizes understanding and agrees with above counseling, assessment and plan. All questions answered.

## 2019-06-25 NOTE — PATIENT INSTRUCTIONS
Call Dr. Prem Diaz for a refill of Androgel. Due for the Pneumovax. Increase your Vit D3 to 2000 IU daily.

## 2019-06-27 DIAGNOSIS — K21.00 REFLUX ESOPHAGITIS: ICD-10-CM

## 2019-06-27 RX ORDER — PANTOPRAZOLE SODIUM 40 MG/1
40 TABLET, DELAYED RELEASE ORAL
Qty: 36 TABLET | Refills: 2 | Status: SHIPPED | OUTPATIENT
Start: 2019-06-28 | End: 2019-09-23 | Stop reason: SDUPTHER

## 2019-06-27 NOTE — TELEPHONE ENCOUNTER
Received fax, as Pantoprazole 40 mg Rx from 6/26/2019 was escribed with 2 sets of directions. Please sign new Rx.

## 2019-08-13 DIAGNOSIS — E03.9 ACQUIRED HYPOTHYROIDISM: ICD-10-CM

## 2019-08-13 RX ORDER — LEVOTHYROXINE SODIUM 100 MCG
100 TABLET ORAL DAILY
Qty: 90 TABLET | Refills: 2 | Status: SHIPPED
Start: 2019-08-13 | End: 2020-06-24 | Stop reason: DRUGHIGH

## 2019-09-11 ENCOUNTER — TELEPHONE (OUTPATIENT)
Dept: PRIMARY CARE CLINIC | Age: 74
End: 2019-09-11

## 2019-09-11 NOTE — TELEPHONE ENCOUNTER
He only needs the TSH done that I ordered at his last appointment. Otherwise, we will not check his full labs until around December (6 months after his most recent labs). Thanks.

## 2019-09-12 LAB — TSH SERPL DL<=0.05 MIU/L-ACNC: 1.04 UIU/ML

## 2019-09-13 ENCOUNTER — TELEPHONE (OUTPATIENT)
Dept: PRIMARY CARE CLINIC | Age: 74
End: 2019-09-13

## 2019-09-23 ENCOUNTER — OFFICE VISIT (OUTPATIENT)
Dept: PRIMARY CARE CLINIC | Age: 74
End: 2019-09-23
Payer: MEDICARE

## 2019-09-23 VITALS
SYSTOLIC BLOOD PRESSURE: 106 MMHG | OXYGEN SATURATION: 98 % | BODY MASS INDEX: 26.74 KG/M2 | RESPIRATION RATE: 18 BRPM | WEIGHT: 191 LBS | TEMPERATURE: 98 F | DIASTOLIC BLOOD PRESSURE: 62 MMHG | HEART RATE: 88 BPM | HEIGHT: 71 IN

## 2019-09-23 DIAGNOSIS — F90.0 ATTENTION DEFICIT HYPERACTIVITY DISORDER (ADHD), PREDOMINANTLY INATTENTIVE TYPE: ICD-10-CM

## 2019-09-23 DIAGNOSIS — E03.9 ACQUIRED HYPOTHYROIDISM: ICD-10-CM

## 2019-09-23 DIAGNOSIS — Z00.00 ROUTINE GENERAL MEDICAL EXAMINATION AT A HEALTH CARE FACILITY: Primary | ICD-10-CM

## 2019-09-23 DIAGNOSIS — K21.00 REFLUX ESOPHAGITIS: ICD-10-CM

## 2019-09-23 DIAGNOSIS — E55.9 VITAMIN D DEFICIENCY: ICD-10-CM

## 2019-09-23 DIAGNOSIS — I10 ESSENTIAL HYPERTENSION: ICD-10-CM

## 2019-09-23 DIAGNOSIS — E11.8 TYPE 2 DIABETES MELLITUS WITH COMPLICATION, WITHOUT LONG-TERM CURRENT USE OF INSULIN (HCC): ICD-10-CM

## 2019-09-23 DIAGNOSIS — E78.3 HYPERCHYLOMICRONEMIA: ICD-10-CM

## 2019-09-23 PROCEDURE — G0439 PPPS, SUBSEQ VISIT: HCPCS | Performed by: FAMILY MEDICINE

## 2019-09-23 RX ORDER — ATOMOXETINE 40 MG/1
CAPSULE ORAL
Qty: 180 CAPSULE | Refills: 1 | Status: SHIPPED | OUTPATIENT
Start: 2019-09-23 | End: 2019-12-18 | Stop reason: SDUPTHER

## 2019-09-23 RX ORDER — PANTOPRAZOLE SODIUM 40 MG/1
40 TABLET, DELAYED RELEASE ORAL
Qty: 36 TABLET | Refills: 2 | Status: SHIPPED
Start: 2019-09-23 | End: 2020-10-12

## 2019-09-23 RX ORDER — LOSARTAN POTASSIUM 50 MG/1
TABLET ORAL
Qty: 90 TABLET | Refills: 1 | Status: SHIPPED
Start: 2019-09-23 | End: 2020-06-29

## 2019-09-23 RX ORDER — LOVASTATIN 40 MG/1
40 TABLET ORAL NIGHTLY
Qty: 90 TABLET | Refills: 1 | Status: SHIPPED | OUTPATIENT
Start: 2019-09-23 | End: 2019-12-18 | Stop reason: ALTCHOICE

## 2019-09-23 ASSESSMENT — PATIENT HEALTH QUESTIONNAIRE - PHQ9
SUM OF ALL RESPONSES TO PHQ QUESTIONS 1-9: 0
SUM OF ALL RESPONSES TO PHQ QUESTIONS 1-9: 0

## 2019-09-23 ASSESSMENT — LIFESTYLE VARIABLES
HOW OFTEN DURING THE LAST YEAR HAVE YOU NEEDED AN ALCOHOLIC DRINK FIRST THING IN THE MORNING TO GET YOURSELF GOING AFTER A NIGHT OF HEAVY DRINKING: 0
HOW OFTEN DO YOU HAVE SIX OR MORE DRINKS ON ONE OCCASION: 0
HOW OFTEN DURING THE LAST YEAR HAVE YOU FOUND THAT YOU WERE NOT ABLE TO STOP DRINKING ONCE YOU HAD STARTED: 0
HAVE YOU OR SOMEONE ELSE BEEN INJURED AS A RESULT OF YOUR DRINKING: 0
HOW OFTEN DURING THE LAST YEAR HAVE YOU BEEN UNABLE TO REMEMBER WHAT HAPPENED THE NIGHT BEFORE BECAUSE YOU HAD BEEN DRINKING: 0
AUDIT-C TOTAL SCORE: 1
HOW MANY STANDARD DRINKS CONTAINING ALCOHOL DO YOU HAVE ON A TYPICAL DAY: 0
HOW OFTEN DURING THE LAST YEAR HAVE YOU HAD A FEELING OF GUILT OR REMORSE AFTER DRINKING: 0
AUDIT TOTAL SCORE: 1
HOW OFTEN DURING THE LAST YEAR HAVE YOU FAILED TO DO WHAT WAS NORMALLY EXPECTED FROM YOU BECAUSE OF DRINKING: 0
HOW OFTEN DO YOU HAVE A DRINK CONTAINING ALCOHOL: 1
HAS A RELATIVE, FRIEND, DOCTOR, OR ANOTHER HEALTH PROFESSIONAL EXPRESSED CONCERN ABOUT YOUR DRINKING OR SUGGESTED YOU CUT DOWN: 0

## 2019-09-23 NOTE — PROGRESS NOTES
Medicare Annual Wellness Visit  Name: Dom Murphy Date: 2019   MRN: [id number] Sex: Male   Age: 72 y.o. 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 These results, as well as other patient data from the [address] form, are documented in Flowsheets linked to this Encounter. Allergies   Allergen Reactions    Asa [Aspirin]     Iv Dye [Iodides]     Pcn [Penicillins] Rash       Prior to Visit Medications    Medication Sig Taking? Authorizing Provider   canagliflozin (INVOKANA) 300 MG TABS tablet Take 1 tablet by mouth daily Indications: Take 0.5 mg daily Yes Tona Hall MD   losartan (COZAAR) 50 MG tablet TAKE 1 TABLET DAILY Yes Tona Hall MD   atomoxetine (STRATTERA) 40 MG capsule TAKE 1 CAPSULE TWICE A DAY Yes Tona Hall MD   pantoprazole (PROTONIX) 40 MG tablet Take 1 tablet by mouth three times a week Yes Tona Hall MD   lovastatin (MEVACOR) 40 MG tablet Take 1 tablet by mouth nightly Yes Tona Hall MD   SYNTHROID 100 MCG tablet Take 1 tablet by mouth Daily Yes Tona Hall MD   ANDROGEL PUMP 20.25 MG/ACT (1.62%) GEL gel Apply 2 actuation topically daily for 90 days.  Indications: two to three pumps daily Yes Tona Hall MD   FLUAD 0.5 ML CHRISTIANO inject 0.5 milliliter intramuscularly Yes Historical Provider, MD   zoster recombinant adjuvanted vaccine (SHINGRIX) 50 MCG/0.5ML SUSR injection inject 0.5 milliliter intramuscularly Yes Historical Provider, MD   Omega-3 Fatty Acids (FISH OIL) 1000 MG CAPS Take 1,000 mg by mouth 3 times daily. Yes Historical Provider, MD   Cyanocobalamin (VITAMIN B 12 PO) Take  by mouth daily. Yes Historical Provider, MD   Vitamin D (CHOLECALCIFEROL) 1000 UNITS CAPS capsule Take 1,000 Units by mouth daily. Yes Historical Provider, MD   Magnesium 500 MG CAPS Take  by mouth daily. Yes Historical Provider, MD       Past Medical History:   Diagnosis Date    Family history of renal cell carcinoma     Hypogonadism in male     Hypothyroidism     Type 2 diabetes mellitus without complication ([facility] [address]     Vitamin D deficiency      Past Surgical History:   Procedure Laterality Date    CATARACT REMOVAL WITH IMPLANT Right 2016    COLONOSCOPY  2016    Dr. Zoë Jones       Family History   Problem Relation Age of Onset    Cancer Brother         Renal cell cancer       CareTeam (Including outside providers/suppliers regularly involved in providing care):   Patient Care Team:  Jessica Mccartney MD as PCP - General (Family Medicine)  Jessica Mccartney MD as PCP - [facility] Empaneled Provider  Rubens Nicholson MD as Consulting Physician (Hematology and Oncology)  Gillian Linares MD as Consulting Physician (Urology)    Wt Readings from Last 3 Encounters:   09/23/19 191 lb (86.6 kg)   06/25/19 190 lb (86.2 kg)   10/31/18 189 lb (85.7 kg)     Vitals:    09/23/19 0821   BP: 106/62   Site: Left Upper Arm   Position: Sitting   Cuff Size: Small Adult   Pulse: 88   Resp: 18   Temp: 98 °F (36.7 °C)   TempSrc: Oral   SpO2: 98%   Weight: 191 lb (86.6 kg)   Height: 5' 11\" (1.803 m)     Body mass index is 26.64 kg/m². Based upon direct observation of the patient, evaluation of cognition reveals recent and remote memory intact.     General Appearance: alert and oriented to cancer screen colonoscopy  07/25/2026    Flu vaccine  Completed    Shingles Vaccine  Completed    Pneumococcal 65+ years Vaccine  Completed     Recommendations for Preventive Services Due: see orders and patient instructions/AVS.  . Recommended screening schedule for the next 5-10 years is provided to the patient in written form: see Patient Instructions/AVS.    Jan Beckford was seen today for medicare awv and health maintenance. Diagnoses and all orders for this visit:    Routine general medical examination at a health care facility  Information given for living will    Essential hypertension, controlled  -     losartan (COZAAR) 50 MG tablet; TAKE 1 TABLET DAILY  - Increase water intake  - continue to monitor; if continues to run low, may consider decreasing losartan d/t the fatigue complaint. Attention deficit hyperactivity disorder (ADHD), predominantly inattentive type  -     atomoxetine (STRATTERA) 40 MG capsule; TAKE 1 CAPSULE TWICE A DAY    Reflux esophagitis  -     pantoprazole (PROTONIX) 40 MG tablet; Take 1 tablet by mouth three times a week    Hyperchylomicronemia  -     lovastatin (MEVACOR) 40 MG tablet; Take 1 tablet by mouth nightly  -     Lipid, Fasting; Future    Type 2 diabetes mellitus with complication, without long-term current use of insulin (HCC), controlled  -     canagliflozin (INVOKANA) 300 MG TABS tablet; Take 1 tablet by mouth daily Indications: Take 0.5 mg daily  -     Microalbumin / Creatinine Urine Ratio; Future  - Patient to check blood sugars few days per week; may consider stopping the invokana    Acquired hypothyroidism    Vitamin D deficiency    RTC in 3 months with fasting lab prior--lab above and lab as ordered previously.

## 2019-09-23 NOTE — PATIENT INSTRUCTIONS
Stop the folic acid. May try biotin for your nails. Check your blood sugars at least a few times per week and write down the numbers. Increase your water intake. Personalized Preventive Plan for [place] November - 9/23/2019  Medicare offers a range of preventive health benefits. Some of the tests and screenings are paid in full while other may be subject to a deductible, co-insurance, and/or copay. Some of these benefits include a comprehensive review of your medical history including lifestyle, illnesses that may run in your family, and various assessments and screenings as appropriate. After reviewing your medical record and screening and assessments performed today your provider may have ordered immunizations, labs, imaging, and/or referrals for you. A list of these orders (if applicable) as well as your Preventive Care list are included within your After Visit Summary for your review. Other Preventive Recommendations:    · A preventive eye exam performed by an eye specialist is recommended every 1-2 years to screen for glaucoma; cataracts, macular degeneration, and other eye disorders. · A preventive dental visit is recommended every 6 months. · Try to get at least 150 minutes of exercise per week or 10,000 steps per day on a pedometer . · Order or download the FREE \"Exercise & Physical Activity: Your Everyday Guide\" from [employer]. Call 6-240.503.8366 or search [employer] online. · You need 4092-2793 mg of calcium and 4721-3877 IU of vitamin D per day. It is possible to meet your calcium requirement with diet alone, but a vitamin D supplement is usually necessary to meet this goal.  · When exposed to the sun, use a sunscreen that protects against both UVA and UVB radiation with an SPF of 30 or greater. Reapply every 2 to 3 hours or after sweating, drying off with a towel, or swimming. · Always wear a seat belt when traveling in a car.  Always will is used only if you can't make or communicate decisions for yourself anymore. If you become able to make decisions again, you can accept or refuse any treatment, no matter what you wrote in your living will. Your state may offer an online registry. This is a place where you can store your living will online so the doctors and nurses who need to treat you can find it right away. What should you think about when creating a living will? Talk about your end-of-life wishes with your family members and your doctor. Let them know what you want. That way the people making decisions for you won't be surprised by your choices. Think about these questions as you make your living will:  Do you know enough about life support methods that might be used? If not, talk to your doctor so you know what might be done if you can't breathe on your own, your heart stops, or you're unable to swallow. What things would you still want to be able to do after you receive life-support methods? Would you want to be able to walk? To speak? To eat on your own? To live without the help of machines? If you have a choice, where do you want to be cared for? In your home? At a hospital or nursing home? Do you want certain Oriental orthodox practices performed if you become very ill? If you have a choice at the end of your life, where would you prefer to die? At home? In a hospital or nursing home? Somewhere else? Would you prefer to be buried or cremated? Do you want your organs to be donated after you die? What should you do with your living will? Make sure that your family members and your health care agent have copies of your living will. Give your doctor a copy of your living will to keep in your medical record. If you have more than one doctor, make sure that each one has a copy. You may want to put a copy of your living will where it can be easily found. Where can you learn more? Go to [email or web address].[email or web address] org and sign

## 2019-09-24 DIAGNOSIS — E11.8 TYPE 2 DIABETES MELLITUS WITH COMPLICATION, WITHOUT LONG-TERM CURRENT USE OF INSULIN (HCC): ICD-10-CM

## 2019-10-10 LAB — DIABETIC RETINOPATHY: NEGATIVE

## 2019-12-02 LAB — PROSTATE SPECIFIC ANTIGEN: 0.9 NG/ML

## 2019-12-03 DIAGNOSIS — E03.9 ACQUIRED HYPOTHYROIDISM: ICD-10-CM

## 2019-12-03 DIAGNOSIS — Z79.899 ENCOUNTER FOR LONG-TERM (CURRENT) USE OF MEDICATIONS: ICD-10-CM

## 2019-12-03 DIAGNOSIS — E78.3 HYPERCHYLOMICRONEMIA: ICD-10-CM

## 2019-12-03 DIAGNOSIS — E55.9 VITAMIN D DEFICIENCY: ICD-10-CM

## 2019-12-03 DIAGNOSIS — E11.8 TYPE 2 DIABETES MELLITUS WITH COMPLICATION, WITHOUT LONG-TERM CURRENT USE OF INSULIN (HCC): ICD-10-CM

## 2019-12-03 DIAGNOSIS — I10 ESSENTIAL HYPERTENSION, BENIGN: ICD-10-CM

## 2019-12-03 DIAGNOSIS — E29.1 MALE HYPOGONADISM: ICD-10-CM

## 2019-12-03 DIAGNOSIS — K21.00 REFLUX ESOPHAGITIS: ICD-10-CM

## 2019-12-03 LAB
ALBUMIN SERPL-MCNC: 4.2 G/DL
ALP BLD-CCNC: 57 U/L
ALT SERPL-CCNC: 25 U/L
ANION GAP SERPL CALCULATED.3IONS-SCNC: NORMAL MMOL/L
AST SERPL-CCNC: 23 U/L
AVERAGE GLUCOSE: NORMAL
BASOPHILS ABSOLUTE: 105 /ΜL
BASOPHILS RELATIVE PERCENT: 1 %
BILIRUB SERPL-MCNC: 0.6 MG/DL (ref 0.1–1.4)
BILIRUBIN, URINE: NEGATIVE
BLOOD, URINE: NEGATIVE
BUN BLDV-MCNC: 17 MG/DL
CALCIUM SERPL-MCNC: 9.1 MG/DL
CHLORIDE BLD-SCNC: 105 MMOL/L
CHOLESTEROL, TOTAL: 161 MG/DL
CHOLESTEROL/HDL RATIO: 3.7
CLARITY: CLEAR
CO2: 28 MMOL/L
COLOR: YELLOW
CREAT SERPL-MCNC: 1.06 MG/DL
CREATININE, URINE: 160
EOSINOPHILS ABSOLUTE: 620 /ΜL
EOSINOPHILS RELATIVE PERCENT: 5.9 %
FERRITIN: 7.92 NG/ML (ref 18–300)
GFR CALCULATED: NORMAL
GLUCOSE BLD-MCNC: 90 MG/DL
GLUCOSE URINE: NORMAL
HBA1C MFR BLD: 6.3 %
HCT VFR BLD CALC: 49.5 % (ref 41–53)
HDLC SERPL-MCNC: 44 MG/DL (ref 35–70)
HEMOGLOBIN: 16 G/DL (ref 13.5–17.5)
KETONES, URINE: NEGATIVE
LDL CHOLESTEROL CALCULATED: 95 MG/DL (ref 0–160)
LEUKOCYTE ESTERASE, URINE: NEGATIVE
LYMPHOCYTES ABSOLUTE: 2121 /ΜL
LYMPHOCYTES RELATIVE PERCENT: 20.2 %
MCH RBC QN AUTO: 26.2 PG
MCHC RBC AUTO-ENTMCNC: 32.3 G/DL
MCV RBC AUTO: 81.1 FL
MICROALBUMIN/CREAT 24H UR: 1 MG/G{CREAT}
MICROALBUMIN/CREAT UR-RTO: 6
MONOCYTES ABSOLUTE: 977 /ΜL
MONOCYTES RELATIVE PERCENT: 9.3 %
NEUTROPHILS ABSOLUTE: 6678 /ΜL
NEUTROPHILS RELATIVE PERCENT: 63.6 %
NITRITE, URINE: NEGATIVE
PDW BLD-RTO: 14.6 %
PH UA: 6 (ref 4.5–8)
PLATELET # BLD: 273 K/ΜL
PMV BLD AUTO: 11.6 FL
POTASSIUM SERPL-SCNC: 4.4 MMOL/L
PROTEIN UA: NEGATIVE
RBC # BLD: 6.1 10^6/ΜL
SODIUM BLD-SCNC: 139 MMOL/L
SPECIFIC GRAVITY, URINE: 1.04
TESTOSTERONE TOTAL: 506
TOTAL PROTEIN: 6.8
TRIGL SERPL-MCNC: 122 MG/DL
TSH SERPL DL<=0.05 MIU/L-ACNC: 0.79 UIU/ML
URIC ACID: 4
UROBILINOGEN, URINE: NORMAL
VITAMIN D 25-HYDROXY: 32
VITAMIN D2, 25 HYDROXY: NORMAL
VITAMIN D3,25 HYDROXY: NORMAL
VLDLC SERPL CALC-MCNC: NORMAL MG/DL
WBC # BLD: 10.5 10^3/ML

## 2019-12-03 PROCEDURE — 81003 URINALYSIS AUTO W/O SCOPE: CPT | Performed by: FAMILY MEDICINE

## 2019-12-18 ENCOUNTER — OFFICE VISIT (OUTPATIENT)
Dept: PRIMARY CARE CLINIC | Age: 74
End: 2019-12-18
Payer: MEDICARE

## 2019-12-18 VITALS
TEMPERATURE: 98.7 F | OXYGEN SATURATION: 97 % | BODY MASS INDEX: 26.5 KG/M2 | HEART RATE: 57 BPM | WEIGHT: 190 LBS | SYSTOLIC BLOOD PRESSURE: 108 MMHG | DIASTOLIC BLOOD PRESSURE: 68 MMHG

## 2019-12-18 DIAGNOSIS — E78.2 MIXED HYPERLIPIDEMIA: ICD-10-CM

## 2019-12-18 DIAGNOSIS — E55.9 VITAMIN D DEFICIENCY: ICD-10-CM

## 2019-12-18 DIAGNOSIS — D75.1 POLYCYTHEMIA: ICD-10-CM

## 2019-12-18 DIAGNOSIS — E03.9 ACQUIRED HYPOTHYROIDISM: ICD-10-CM

## 2019-12-18 DIAGNOSIS — F90.0 ATTENTION DEFICIT HYPERACTIVITY DISORDER (ADHD), PREDOMINANTLY INATTENTIVE TYPE: ICD-10-CM

## 2019-12-18 DIAGNOSIS — R73.03 PREDIABETES: ICD-10-CM

## 2019-12-18 DIAGNOSIS — E29.1 HYPOGONADISM IN MALE: ICD-10-CM

## 2019-12-18 DIAGNOSIS — I10 ESSENTIAL HYPERTENSION: Primary | ICD-10-CM

## 2019-12-18 PROCEDURE — 99214 OFFICE O/P EST MOD 30 MIN: CPT | Performed by: FAMILY MEDICINE

## 2019-12-18 RX ORDER — NEOMYCIN SULFATE, POLYMYXIN B SULFATE AND DEXAMETHASONE 3.5; 10000; 1 MG/ML; [USP'U]/ML; MG/ML
SUSPENSION/ DROPS OPHTHALMIC
Refills: 0 | COMMUNITY
Start: 2019-10-10 | End: 2020-09-28 | Stop reason: ALTCHOICE

## 2019-12-18 RX ORDER — ATOMOXETINE 40 MG/1
CAPSULE ORAL
Qty: 180 CAPSULE | Refills: 1 | Status: SHIPPED
Start: 2019-12-18 | End: 2020-08-07

## 2019-12-18 RX ORDER — ROSUVASTATIN CALCIUM 20 MG/1
20 TABLET, COATED ORAL DAILY
Qty: 90 TABLET | Refills: 3 | Status: SHIPPED
Start: 2019-12-18 | End: 2020-11-11 | Stop reason: SDUPTHER

## 2019-12-18 RX ORDER — LOVASTATIN 40 MG/1
40 TABLET ORAL NIGHTLY
Qty: 90 TABLET | Refills: 1 | Status: CANCELLED | OUTPATIENT
Start: 2019-12-18

## 2019-12-18 SDOH — ECONOMIC STABILITY: TRANSPORTATION INSECURITY
IN THE PAST 12 MONTHS, HAS THE LACK OF TRANSPORTATION KEPT YOU FROM MEDICAL APPOINTMENTS OR FROM GETTING MEDICATIONS?: NO

## 2019-12-18 SDOH — ECONOMIC STABILITY: TRANSPORTATION INSECURITY
IN THE PAST 12 MONTHS, HAS LACK OF TRANSPORTATION KEPT YOU FROM MEETINGS, WORK, OR FROM GETTING THINGS NEEDED FOR DAILY LIVING?: NO

## 2019-12-18 SDOH — ECONOMIC STABILITY: FOOD INSECURITY: WITHIN THE PAST 12 MONTHS, YOU WORRIED THAT YOUR FOOD WOULD RUN OUT BEFORE YOU GOT MONEY TO BUY MORE.: NEVER TRUE

## 2019-12-18 SDOH — ECONOMIC STABILITY: FOOD INSECURITY: WITHIN THE PAST 12 MONTHS, THE FOOD YOU BOUGHT JUST DIDN'T LAST AND YOU DIDN'T HAVE MONEY TO GET MORE.: NEVER TRUE

## 2019-12-18 SDOH — ECONOMIC STABILITY: INCOME INSECURITY: HOW HARD IS IT FOR YOU TO PAY FOR THE VERY BASICS LIKE FOOD, HOUSING, MEDICAL CARE, AND HEATING?: NOT VERY HARD

## 2020-03-12 ENCOUNTER — TELEPHONE (OUTPATIENT)
Dept: PRIMARY CARE CLINIC | Age: 75
End: 2020-03-12

## 2020-04-20 ENCOUNTER — TELEPHONE (OUTPATIENT)
Dept: FAMILY MEDICINE CLINIC | Age: 75
End: 2020-04-20

## 2020-04-20 NOTE — TELEPHONE ENCOUNTER
Pt would like to speak to [facility] regarding options for a new Blood doctor. [facility]. His doc is retire.    [phone]

## 2020-06-02 ENCOUNTER — TELEPHONE (OUTPATIENT)
Dept: FAMILY MEDICINE CLINIC | Age: 75
End: 2020-06-02

## 2020-06-02 NOTE — TELEPHONE ENCOUNTER
Patient called inquiring about COVID antibody testing and if Dr. Itz Berman would be willing to order this for him to have drawn with his routine labs in the next 2 weeks prior to his appointment with her 6/24. He states he was ill in January, and his symptoms were consistent with COVID-19.

## 2020-06-04 NOTE — TELEPHONE ENCOUNTER
What is he planning to do with the results? Our current tests are not perfect. A negative test may be wrong. A positive test may not mean anything but he did probably have it; it does not mean he would be immune. Thanks.

## 2020-06-13 LAB
ALBUMIN SERPL-MCNC: NORMAL G/DL
ALP BLD-CCNC: NORMAL U/L
ALT SERPL-CCNC: NORMAL U/L
ANION GAP SERPL CALCULATED.3IONS-SCNC: NORMAL MMOL/L
AST SERPL-CCNC: NORMAL U/L
AVERAGE GLUCOSE: NORMAL
BASOPHILS ABSOLUTE: NORMAL
BASOPHILS RELATIVE PERCENT: NORMAL
BILIRUB SERPL-MCNC: NORMAL MG/DL
BUN BLDV-MCNC: NORMAL MG/DL
CALCIUM SERPL-MCNC: NORMAL MG/DL
CHLORIDE BLD-SCNC: NORMAL MMOL/L
CHOLESTEROL, FASTING: 128
CO2: NORMAL
CREAT SERPL-MCNC: NORMAL MG/DL
CREATININE, URINE: NORMAL
EOSINOPHILS ABSOLUTE: NORMAL
EOSINOPHILS RELATIVE PERCENT: NORMAL
GFR CALCULATED: NORMAL
GLUCOSE BLD-MCNC: NORMAL MG/DL
HBA1C MFR BLD: 6.1 %
HCT VFR BLD CALC: NORMAL %
HDLC SERPL-MCNC: 43 MG/DL (ref 35–70)
HEMOGLOBIN: NORMAL
LDL CHOLESTEROL CALCULATED: 69 MG/DL (ref 0–160)
LYMPHOCYTES ABSOLUTE: NORMAL
LYMPHOCYTES RELATIVE PERCENT: NORMAL
MCH RBC QN AUTO: NORMAL PG
MCHC RBC AUTO-ENTMCNC: NORMAL G/DL
MCV RBC AUTO: NORMAL FL
MICROALBUMIN/CREAT 24H UR: NORMAL MG/G{CREAT}
MICROALBUMIN/CREAT UR-RTO: NORMAL
MONOCYTES ABSOLUTE: NORMAL
MONOCYTES RELATIVE PERCENT: NORMAL
NEUTROPHILS ABSOLUTE: NORMAL
NEUTROPHILS RELATIVE PERCENT: NORMAL
PDW BLD-RTO: NORMAL %
PLATELET # BLD: NORMAL 10*3/UL
PMV BLD AUTO: NORMAL FL
POTASSIUM SERPL-SCNC: NORMAL MMOL/L
RBC # BLD: NORMAL 10*6/UL
SODIUM BLD-SCNC: NORMAL MMOL/L
TOTAL PROTEIN: NORMAL
TRIGLYCERIDE, FASTING: 84
TSH SERPL DL<=0.05 MIU/L-ACNC: 0.3 UIU/ML
VITAMIN D 25-HYDROXY: 45
VITAMIN D2, 25 HYDROXY: NORMAL
VITAMIN D3,25 HYDROXY: NORMAL
WBC # BLD: NORMAL 10*3/UL

## 2020-06-24 ENCOUNTER — OFFICE VISIT (OUTPATIENT)
Dept: FAMILY MEDICINE CLINIC | Age: 75
End: 2020-06-24
Payer: MEDICARE

## 2020-06-24 VITALS
HEIGHT: 71 IN | SYSTOLIC BLOOD PRESSURE: 136 MMHG | DIASTOLIC BLOOD PRESSURE: 72 MMHG | BODY MASS INDEX: 26.32 KG/M2 | TEMPERATURE: 97.3 F | RESPIRATION RATE: 16 BRPM | HEART RATE: 51 BPM | OXYGEN SATURATION: 93 % | WEIGHT: 188 LBS

## 2020-06-24 PROCEDURE — 99214 OFFICE O/P EST MOD 30 MIN: CPT | Performed by: FAMILY MEDICINE

## 2020-06-24 RX ORDER — LEVOTHYROXINE SODIUM 100 MCG
100 TABLET ORAL DAILY
Qty: 90 TABLET | Refills: 2 | Status: SHIPPED
Start: 2020-06-24 | End: 2020-07-10 | Stop reason: DRUGHIGH

## 2020-06-24 ASSESSMENT — PATIENT HEALTH QUESTIONNAIRE - PHQ9
SUM OF ALL RESPONSES TO PHQ QUESTIONS 1-9: 0
2. FEELING DOWN, DEPRESSED OR HOPELESS: 0
SUM OF ALL RESPONSES TO PHQ QUESTIONS 1-9: 0
SUM OF ALL RESPONSES TO PHQ9 QUESTIONS 1 & 2: 0
1. LITTLE INTEREST OR PLEASURE IN DOING THINGS: 0

## 2020-06-24 NOTE — PROGRESS NOTES
sensitive skin  ENT:  No cough, no sore throat, no sinus pain, no runny nose, +left ear pain/itching (stuck a q-tip and sneezed, +pain and little bleeding, +occ altered hearing)  Cardiology:  No chest pain, no palpitations, no leg edema, no shortness of breath, no PND  Gastroenterology:  No dysphagia, no abdominal pain, no nausea, no vomiting, no constipation, no diarrhea, no heartburn  Musculoskeletal:  + joint pain, no leg cramps, no back pain, no muscle aches  Neuro:  No dizziness, no numbness/tingling  Respiratory:  No shortness of breath, no orthopnea, no wheezing, no TAPIA  Urology:  No blood in the urine, no urinary frequency, no urinary incontinence, no urinary urgency, no nocturia, no dysuria    Vitals:    06/24/20 0834   BP: 136/72   Site: Left Upper Arm   Position: Sitting   Cuff Size: Medium Adult   Pulse: 51   Resp: 16   Temp: 97.3 °F (36.3 °C)   TempSrc: Temporal   SpO2: 93%   Weight: 188 lb (85.3 kg)   Height: 5' 11\" (1.803 m)     Body mass index is 26.22 kg/m². General:  Patient alert and oriented x 3, NAD, pleasant, normal-appearing weight  HEENT:  Atraumatic, normocephalic, pupils equal and normal, EOMI, clear conjunctiva, right TM normal, left TM (question of hole vs severe retraction)  Neck:  Supple, no goiter, no carotid bruits, no LAD  Lungs:  CTA B, symmetric breath sounds, no resp distress  Heart:  RRR, no murmurs, gallops or rubs  Abdomen:  Soft/nt/nd, + bowel sounds  Extremities:  No clubbing, cyanosis, edema  Skin: unremarkable    Tympanogram: Normal left ear, right ear broadened    Assessment/Plan:    Vy Heredia was seen today for hypertension and hyperlipidemia. Diagnoses and all orders for this visit:    Essential hypertension, controlled  + Continue current medication(s) along with dietary and lifestyle modifications. Acquired hypothyroidism, uncontrolled, TSH too low  -     SYNTHROID 100 MCG tablet;  Take 1 tablet by mouth Daily (take 1/2 dose 1 day/week)  -     TSH without Reflex;

## 2020-06-29 RX ORDER — LOSARTAN POTASSIUM 50 MG/1
TABLET ORAL
Qty: 90 TABLET | Refills: 3 | Status: SHIPPED
Start: 2020-06-29 | End: 2021-07-21

## 2020-07-09 ENCOUNTER — TELEPHONE (OUTPATIENT)
Dept: FAMILY MEDICINE CLINIC | Age: 75
End: 2020-07-09

## 2020-07-09 NOTE — TELEPHONE ENCOUNTER
Patient called c/o intermittent \"echoing\" in his left ear after recent punctured ear drum (sneezed while Q-tip was in ear) that occurred soon before his most recent appointment 6/24/20. He states he can hear himself breathing, swallowing, and his own voice echoing which is very annoying. He asks if there is a drop or something he can try to help with this?

## 2020-07-09 NOTE — TELEPHONE ENCOUNTER
There's not a drop to fix it. He'll need to see ENT. Does he have a preference on which ENT? Thanks.

## 2020-07-10 ENCOUNTER — TELEPHONE (OUTPATIENT)
Dept: FAMILY MEDICINE CLINIC | Age: 75
End: 2020-07-10

## 2020-07-10 RX ORDER — LEVOTHYROXINE SODIUM 88 MCG
88 TABLET ORAL DAILY
Qty: 90 TABLET | Refills: 2 | Status: SHIPPED
Start: 2020-07-10 | End: 2021-04-05

## 2020-07-10 NOTE — TELEPHONE ENCOUNTER
Patient is requesting refill for decreased dose of Synthroid (discussed in the office 6/24/20) to be sent to [facility] please

## 2020-08-07 RX ORDER — ATOMOXETINE 40 MG/1
CAPSULE ORAL
Qty: 180 CAPSULE | Refills: 1 | Status: SHIPPED
Start: 2020-08-07 | End: 2021-02-02

## 2020-09-28 ENCOUNTER — OFFICE VISIT (OUTPATIENT)
Dept: FAMILY MEDICINE CLINIC | Age: 75
End: 2020-09-28
Payer: MEDICARE

## 2020-09-28 ENCOUNTER — HOSPITAL ENCOUNTER (OUTPATIENT)
Age: 75
Discharge: HOME | End: 2020-09-30
Payer: MEDICARE

## 2020-09-28 VITALS
RESPIRATION RATE: 16 BRPM | TEMPERATURE: 97.1 F | SYSTOLIC BLOOD PRESSURE: 132 MMHG | OXYGEN SATURATION: 99 % | HEIGHT: 71 IN | BODY MASS INDEX: 26.32 KG/M2 | DIASTOLIC BLOOD PRESSURE: 72 MMHG | HEART RATE: 98 BPM | WEIGHT: 188 LBS

## 2020-09-28 PROBLEM — E11.8 TYPE 2 DIABETES MELLITUS WITH COMPLICATION, WITHOUT LONG-TERM CURRENT USE OF INSULIN (HCC): Status: ACTIVE | Noted: 2020-09-28

## 2020-09-28 PROCEDURE — G0439 PPPS, SUBSEQ VISIT: HCPCS | Performed by: FAMILY MEDICINE

## 2020-09-28 PROCEDURE — 84439 ASSAY OF FREE THYROXINE: CPT

## 2020-09-28 PROCEDURE — 36415 COLL VENOUS BLD VENIPUNCTURE: CPT | Performed by: FAMILY MEDICINE

## 2020-09-28 PROCEDURE — 84443 ASSAY THYROID STIM HORMONE: CPT

## 2020-09-28 RX ORDER — MINOXIDIL 2 %
1 SOLUTION, NON-ORAL TOPICAL DAILY
COMMUNITY

## 2020-09-28 ASSESSMENT — PATIENT HEALTH QUESTIONNAIRE - PHQ9
SUM OF ALL RESPONSES TO PHQ9 QUESTIONS 1 & 2: 0
2. FEELING DOWN, DEPRESSED OR HOPELESS: 0
SUM OF ALL RESPONSES TO PHQ QUESTIONS 1-9: 0
1. LITTLE INTEREST OR PLEASURE IN DOING THINGS: 0
SUM OF ALL RESPONSES TO PHQ QUESTIONS 1-9: 0

## 2020-09-28 ASSESSMENT — LIFESTYLE VARIABLES
AUDIT-C TOTAL SCORE: 1
HOW OFTEN DO YOU HAVE SIX OR MORE DRINKS ON ONE OCCASION: 0
HOW MANY STANDARD DRINKS CONTAINING ALCOHOL DO YOU HAVE ON A TYPICAL DAY: 0
HOW OFTEN DO YOU HAVE A DRINK CONTAINING ALCOHOL: 1

## 2020-09-28 NOTE — PATIENT INSTRUCTIONS
Personalized Preventive Plan for Dusty Blue - 9/28/2020  Medicare offers a range of preventive health benefits. Some of the tests and screenings are paid in full while other may be subject to a deductible, co-insurance, and/or copay. Some of these benefits include a comprehensive review of your medical history including lifestyle, illnesses that may run in your family, and various assessments and screenings as appropriate. After reviewing your medical record and screening and assessments performed today your provider may have ordered immunizations, labs, imaging, and/or referrals for you. A list of these orders (if applicable) as well as your Preventive Care list are included within your After Visit Summary for your review. Other Preventive Recommendations:    · A preventive eye exam performed by an eye specialist is recommended every 1-2 years to screen for glaucoma; cataracts, macular degeneration, and other eye disorders. · A preventive dental visit is recommended every 6 months. · Try to get at least 150 minutes of exercise per week or 10,000 steps per day on a pedometer . · Order or download the FREE \"Exercise & Physical Activity: Your Everyday Guide\" from [employer]. Call 0-160.690.8963 or search [employer] online. · You need 3471-1691 mg of calcium and 0538-4894 IU of vitamin D per day. It is possible to meet your calcium requirement with diet alone, but a vitamin D supplement is usually necessary to meet this goal.  · When exposed to the sun, use a sunscreen that protects against both UVA and UVB radiation with an SPF of 30 or greater. Reapply every 2 to 3 hours or after sweating, drying off with a towel, or swimming. · Always wear a seat belt when traveling in a car. Always wear a helmet when riding a bicycle or motorcycle.

## 2020-09-28 NOTE — PROGRESS NOTES
Medicare Annual Wellness Visit  Name: Dirk Lerner Date: 2020   MRN: [id number] Sex: Male   Age: 70 y.o. 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 These results, as well as other patient data from the [address] form, are documented in Flowsheets linked to this Encounter. Allergies   Allergen Reactions    Pcn [Penicillins] Rash         Prior to Visit Medications    Medication Sig Taking? Authorizing Provider   Omega-3 Fatty Acids ([facility] FISH OIL) 1200 MG CAPS Take 1 capsule by mouth daily Yes Historical Provider, MD   atomoxetine (STRATTERA) 40 MG capsule TAKE 1 CAPSULE TWICE A DAY Yes He Arshad MD   SYNTHROID 88 MCG tablet Take 1 tablet by mouth daily Take with water on an empty stomach- wait 30 minutes before eating or taking other meds. Yes He Arshad MD   losartan (COZAAR) 50 MG tablet TAKE 1 TABLET DAILY Yes He Arshad MD   canagliflozin (INVOKANA) 300 MG TABS tablet Take 1/2 tablet daily Yes He Arshad MD   rosuvastatin (CRESTOR) 20 MG tablet Take 1 tablet by mouth daily Yes He Arshad MD   pantoprazole (PROTONIX) 40 MG tablet Take 1 tablet by mouth three times a week Yes He Arshad MD   Cyanocobalamin (VITAMIN B 12 PO) Take  by mouth daily. Yes Historical Provider, MD   Vitamin D (CHOLECALCIFEROL) 1000 UNITS CAPS capsule Take 3,000 Units by mouth daily  Yes Historical Provider, MD   Magnesium 500 MG CAPS Take  by mouth daily. Yes Historical Provider, MD   ANDROGEL PUMP 20.25 MG/ACT (1.62%) GEL gel Apply 2 actuation topically daily for 90 days.  Indications: two to three pumps daily  He Arshad MD Past Medical History:   Diagnosis Date    Family history of renal cell carcinoma     Hypogonadism in male     Hypothyroidism     Vitamin D deficiency        Past Surgical History:   Procedure Laterality Date    CARPAL TUNNEL RELEASE Bilateral 2009    CATARACT REMOVAL WITH IMPLANT Right 2016    COLONOSCOPY  2016    Dr. Kiersten Persaud         Family History   Problem Relation Age of Onset    Cancer Brother         Renal cell cancer       CareTeam (Including outside providers/suppliers regularly involved in providing care):   Patient Care Team:  Miracle Jernigan MD as PCP - General (Family Medicine)  Miracle Jernigan MD as PCP - [facility] Provider  Greta Woods MD as Consulting Physician (Hematology and Oncology)  Angelica Valle MD as Consulting Physician (Urology)    Wt Readings from Last 3 Encounters:   09/28/20 188 lb (85.3 kg)   06/24/20 188 lb (85.3 kg)   12/18/19 190 lb (86.2 kg)     Vitals:    09/28/20 0834 09/28/20 0913   BP: (!) 156/84 132/72   Site: Left Upper Arm    Position: Sitting    Cuff Size: Medium Adult    Pulse: 98    Resp: 16    Temp: 97.1 °F (36.2 °C)    TempSrc: Temporal    SpO2: 99%    Weight: 188 lb (85.3 kg)    Height: 5' 11\" (1.803 m)      Body mass index is 26.22 kg/m². Based upon direct observation of the patient, evaluation of cognition reveals recent and remote memory intact.     General Appearance: alert and oriented to person, place and time, well-developed and well-nourished, in no acute distress  Head: normocephalic and atraumatic  Eyes: pupils equal, round, extraocular eye movements intact, conjunctivae normal  Neck: neck supple and non tender without mass, no thyromegaly or thyroid nodules, no cervical lymphadenopathy   Pulmonary/Chest: clear to auscultation bilaterally- no wheezes, rales or rhonchi, normal air movement, no respiratory distress  Cardiovascular: normal rate, normal S1 and S2, no gallops, intact distal pulses and no carotid bruits  Extremities: Health Maintenance   Topic Date Due    Hepatitis C screen  1945    Diabetic foot exam  09/27/1957    Annual Wellness Visit (AWV)  06/23/2019    Flu vaccine (1) 09/01/2020    Statin Therapy  12/18/2020    A1C test (Diabetic or Prediabetic)  06/12/2021    Lipid screen  06/12/2021    TSH testing  06/12/2021    Potassium monitoring  06/12/2021    Creatinine monitoring  06/12/2021    Diabetic microalbuminuria test  06/13/2021    Diabetic retinal exam  10/10/2021    DTaP/Tdap/Td vaccine (2 - Td) 02/14/2026    Colon cancer screen colonoscopy  07/25/2026    Shingles Vaccine  Completed    Pneumococcal 65+ years Vaccine  Completed    Hepatitis A vaccine  Aged Out    Hib vaccine  Aged Out    Meningococcal (ACWY) vaccine  Aged Out     Recommendations for [place] Due: see orders and patient instructions/AVS.  . Recommended screening schedule for the next 5-10 years is provided to the patient in written form: see Patient Instructions/AVS.    Sydni Cervantes was seen today for medicare awv. Diagnoses and all orders for this visit:    Routine general medical examination at a health care facility  Information for living will and DNR given--told to bring DNR paperwork with him for next visit    Acquired hypothyroidism  -     TSH without Reflex; Future  + Recheck lab today after dose adjustment    Type 2 diabetes mellitus with complication, without long-term current use of insulin (HCC)  -     CBC Auto Differential; Future  -     Comprehensive Metabolic Panel; Future  -     Hemoglobin A1C; Future  -     Lipid Panel; Future    Hypogonadism in male  -     PSA SCREENING; Future  -     TESTOSTERONE; Future    Encounter for screening for malignant neoplasm of prostate   -     PSA SCREENING; Future      RTC in 3 months with above fasting lab prior.

## 2020-09-29 LAB
T4 FREE: 1.38 NG/DL (ref 0.93–1.7)
TSH SERPL DL<=0.05 MIU/L-ACNC: 0.89 UIU/ML (ref 0.27–4.2)

## 2020-10-12 RX ORDER — PANTOPRAZOLE SODIUM 40 MG/1
TABLET, DELAYED RELEASE ORAL
Qty: 36 TABLET | Refills: 3 | Status: SHIPPED
Start: 2020-10-12 | End: 2021-09-07 | Stop reason: SDUPTHER

## 2020-10-12 NOTE — TELEPHONE ENCOUNTER
Last Appointment   9/28/2020  Next Appointment  12/30/2020    Last refill was 9/23/2019, patient takes 3 times weekly prn.   Pantoprazole pending drs approval.

## 2020-10-19 ENCOUNTER — TELEPHONE (OUTPATIENT)
Dept: FAMILY MEDICINE CLINIC | Age: 75
End: 2020-10-19

## 2020-10-22 NOTE — TELEPHONE ENCOUNTER
Pharmacist Breezy Lopez) with [facility] called to request removal of \"TRACY\" from Synthroid prescription. She explained they use name brand medication as \"house brand\" and can save the patient significant cost if they get permission to waive TRACY.      Call back # is [phone], Ref # [id number]

## 2020-10-22 NOTE — TELEPHONE ENCOUNTER
If it is still brand \"Synthroid,\" then OK. If it will not be, then needs to be discussed with the patient. Thanks.

## 2020-10-23 NOTE — TELEPHONE ENCOUNTER
Spoke to Binta Ramos - she advised that it is still brand synthroid - it is just considered a house brand. The do not carry levothyroxine at this time. They advised that once this medication comes back in they will let us and the patient know so we can start putting TRACY back on the script.    Gave verbal OK to switch over to their brand

## 2020-11-11 RX ORDER — ROSUVASTATIN CALCIUM 20 MG/1
20 TABLET, COATED ORAL DAILY
Qty: 90 TABLET | Refills: 1 | Status: SHIPPED
Start: 2020-11-11 | End: 2021-05-06

## 2020-11-11 RX ORDER — ROSUVASTATIN CALCIUM 20 MG/1
TABLET, COATED ORAL
Qty: 90 TABLET | Refills: 3 | OUTPATIENT
Start: 2020-11-11

## 2020-11-11 NOTE — TELEPHONE ENCOUNTER
Requested Prescriptions     Pending Prescriptions Disp Refills    rosuvastatin (CRESTOR) 20 MG tablet 90 tablet 1     Sig: Take 1 tablet by mouth daily

## 2020-12-10 LAB
ALBUMIN SERPL-MCNC: 4.4 G/DL
ALP BLD-CCNC: 49 U/L
ALT SERPL-CCNC: 29 U/L
ANION GAP SERPL CALCULATED.3IONS-SCNC: NORMAL MMOL/L
AST SERPL-CCNC: 26 U/L
AVERAGE GLUCOSE: NORMAL
BASOPHILS ABSOLUTE: NORMAL
BASOPHILS RELATIVE PERCENT: NORMAL
BILIRUB SERPL-MCNC: 0.6 MG/DL (ref 0.1–1.4)
BUN BLDV-MCNC: 16 MG/DL
CALCIUM SERPL-MCNC: 9 MG/DL
CHLORIDE BLD-SCNC: 104 MMOL/L
CHOLESTEROL, TOTAL: 133 MG/DL
CHOLESTEROL/HDL RATIO: 2.8
CO2: 26 MMOL/L
CREAT SERPL-MCNC: 1.11 MG/DL
EOSINOPHILS ABSOLUTE: NORMAL
EOSINOPHILS RELATIVE PERCENT: NORMAL
GFR CALCULATED: NORMAL
GLUCOSE BLD-MCNC: 75 MG/DL
HBA1C MFR BLD: 6 %
HCT VFR BLD CALC: 49.1 % (ref 41–53)
HDLC SERPL-MCNC: 48 MG/DL (ref 35–70)
HEMOGLOBIN: 15.9 G/DL (ref 13.5–17.5)
LDL CHOLESTEROL CALCULATED: 71 MG/DL (ref 0–160)
LYMPHOCYTES ABSOLUTE: NORMAL
LYMPHOCYTES RELATIVE PERCENT: NORMAL
MCH RBC QN AUTO: NORMAL PG
MCHC RBC AUTO-ENTMCNC: NORMAL G/DL
MCV RBC AUTO: NORMAL FL
MONOCYTES ABSOLUTE: NORMAL
MONOCYTES RELATIVE PERCENT: NORMAL
NEUTROPHILS ABSOLUTE: NORMAL
NEUTROPHILS RELATIVE PERCENT: NORMAL
NONHDLC SERPL-MCNC: NORMAL MG/DL
PDW BLD-RTO: NORMAL %
PLATELET # BLD: 248 K/ΜL
PMV BLD AUTO: NORMAL FL
POTASSIUM SERPL-SCNC: 4.7 MMOL/L
PROSTATE SPECIFIC ANTIGEN: 0.6 NG/ML
RBC # BLD: 5.95 10^6/ΜL
SODIUM BLD-SCNC: 139 MMOL/L
TESTOSTERONE TOTAL: 329
TOTAL PROTEIN: 6.8
TRIGL SERPL-MCNC: 62 MG/DL
TSH SERPL DL<=0.05 MIU/L-ACNC: 0.46 UIU/ML
VLDLC SERPL CALC-MCNC: NORMAL MG/DL
WBC # BLD: 9.8 10^3/ML

## 2020-12-30 ENCOUNTER — OFFICE VISIT (OUTPATIENT)
Dept: FAMILY MEDICINE CLINIC | Age: 75
End: 2020-12-30
Payer: MEDICARE

## 2020-12-30 VITALS
BODY MASS INDEX: 26.46 KG/M2 | TEMPERATURE: 96.7 F | DIASTOLIC BLOOD PRESSURE: 72 MMHG | RESPIRATION RATE: 16 BRPM | HEART RATE: 102 BPM | HEIGHT: 71 IN | SYSTOLIC BLOOD PRESSURE: 138 MMHG | WEIGHT: 189 LBS | OXYGEN SATURATION: 98 %

## 2020-12-30 PROBLEM — E11.8 TYPE 2 DIABETES MELLITUS WITH COMPLICATION, WITHOUT LONG-TERM CURRENT USE OF INSULIN (HCC): Status: RESOLVED | Noted: 2020-09-28 | Resolved: 2020-12-30

## 2020-12-30 PROCEDURE — 99214 OFFICE O/P EST MOD 30 MIN: CPT | Performed by: FAMILY MEDICINE

## 2020-12-30 NOTE — PROGRESS NOTES
Subjective:  70 y.o. y/o male is here for follow up chronic hypertension. This is generally controlled on current medication regimen. Takes meds as directed and tolerates them well. Most recent labs reviewed with patient and are not remarkable. No symptoms from htn standpoint per ROS. Patient is compliant with lifestyle modifications. Patient does not smoke. Comorbid conditions include below. Urology: Dr. Elva Vizcarra, hypogonadism, Androgel, +occ phlebotomies, reviewed  OV, yearly appt, brother  of RCC, PSA 0.6  Hematology: Dr. 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, +h/o multiple phlebotomies, monitoring small lung nodules, reviewed  OV, HCT 49.3 from last week, appt next month  ADD: Strattera for years, helps with concentration, still working  Prediabetes: Invokana, DM eye exam 10/19 [place], Hgb A1C 6.0  Hypothyroidism: TSH on the low side, no symptoms, Synthroid 88 mcg daily  Hyperlipidemia: LDL 71, taking rosuvastatin 20mg daily, tolerating  The 10-year ASCVD risk score (Ines Russell., et al., 2013) is: 42.3%    Values used to calculate the score:      Age: 70 years      Sex: Male      Is Non- : No      Diabetic: Yes      Tobacco smoker: No      Systolic Blood Pressure: 582 mmHg      Is BP treated: Yes      HDL Cholesterol: 48 mg/dL      Total Cholesterol: 133 mg/dL  Vit D def: Taking 3000 IU daily, level now normal  Cataracts on right, 23, right cataract surgery  Colonoscopy, 2016, 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 Medications and allergies also reviewed and updated in chart.         Review of Systems:  Constitutional:  No fever, + fatigue, no chills, no headaches, no weight change  Dermatology:  No rash, no mole, no dry or sensitive skin  ENT:  No cough, no sore throat, no sinus pain, no runny nose  Cardiology:  No chest pain, no palpitations, no leg edema, no shortness of breath, no PND  Gastroenterology:  No dysphagia, no abdominal pain, no nausea, no vomiting, no constipation, no diarrhea, no heartburn  Musculoskeletal:  + joint pain, no leg cramps, no back pain, no muscle aches  Neuro:  No dizziness, no numbness/tingling  Respiratory:  No shortness of breath, no orthopnea, no wheezing, no TAPIA  Urology:  No blood in the urine, no urinary frequency, no urinary incontinence, no urinary urgency, no nocturia, no dysuria    Vitals:    12/30/20 0835   BP: 138/72   Pulse: 102   Resp: 16   Temp: 96.7 °F (35.9 °C)   TempSrc: Temporal   SpO2: 98%   Weight: 189 lb (85.7 kg)   Height: 5' 11\" (1.803 m)     Body mass index is 26.36 kg/m². General:  Patient alert and oriented x 3, NAD, pleasant, normal-appearing weight  HEENT:  Atraumatic, normocephalic, pupils equal and normal, EOMI, clear conjunctiva, +mask  Neck:  Supple, no goiter, no carotid bruits, no LAD  Lungs:  CTA B, symmetric breath sounds, no resp distress  Heart:  RRR, no murmurs, gallops or rubs  Abdomen:  Soft/nt/nd, + bowel sounds  Extremities:  No clubbing, cyanosis, edema  Skin: unremarkable    Tympanogram: Normal left ear, right ear broadened    Assessment/Plan:    Reilly Chávez was seen today for hypertension, hypothyroidism and diabetes. Diagnoses and all orders for this visit:    Acquired hypothyroidism, no symptoms  -     TSH without Reflex; Future  + Continue current dosing    Mixed hyperlipidemia, controlled  -     Lipid, Fasting; Future  + Continue current medication(s) along with dietary and lifestyle modifications. Prediabetes, Hgb A1C 6.0, +invokana  -     Hemoglobin A1C; Future  + Continue current medication(s) along with dietary and lifestyle modifications.     Polycythemia, stable, secondary  -     CBC Auto Differential; Future  + Keep appt with hematology next month    Hypogonadism in male, +androgel, seeing urology regularly  -     CBC Auto Differential; Future  -     Comprehensive Metabolic Panel; Future  + Labs OK    Essential hypertension, controlled  -     CBC Auto Differential; Future  -     Comprehensive Metabolic Panel; Future  -     Lipid, Fasting; Future  -     Microalbumin / Creatinine Urine Ratio; Future  -     TSH without Reflex; Future  + Continue current medication(s) along with dietary and lifestyle modifications. As above. Call or go to ED immediately if symptoms worsen or persist.  Return in about 6 months (around 6/30/2021). with above lab prior, or sooner if necessary. Educational materials and/or home exercises printed for patient's review and were included in patient instructions on his/her After Visit Summary and given to patient at the end of visit. Counseled regarding above diagnosis, including possible risks and complications,  especially if left uncontrolled. Counseled regarding the possible side effects, risks, benefits and alternatives to treatment; patient and/or guardian verbalizes understanding, agrees, feels comfortable with and wishes to proceed with above treatment plan. Advised patient to call with any new medication issues, and read all Rx info from pharmacy to assure aware of all possible risks and side effects of medication before taking. Reviewed age and gender appropriate health screening exams and vaccinations. Advised patient regarding importance of keeping up with recommended health maintenance and to schedule as soon as possible if overdue, as this is important in assessing for undiagnosed pathology, especially cancer, as well as protecting against potentially harmful/life threatening disease. Patient and/or guardian verbalizes understanding and agrees with above counseling, assessment and plan. All questions answered.

## 2021-05-06 DIAGNOSIS — E78.2 MIXED HYPERLIPIDEMIA: ICD-10-CM

## 2021-05-06 RX ORDER — ROSUVASTATIN CALCIUM 20 MG/1
TABLET, COATED ORAL
Qty: 90 TABLET | Refills: 3 | Status: SHIPPED
Start: 2021-05-06 | End: 2022-05-02

## 2021-06-07 DIAGNOSIS — R73.03 PREDIABETES: ICD-10-CM

## 2021-06-07 NOTE — TELEPHONE ENCOUNTER
Requested Prescriptions     Pending Prescriptions Disp Refills    canagliflozin (INVOKANA) 300 MG TABS tablet 45 tablet 3     Sig: Take 1/2 tablet daily

## 2021-06-24 DIAGNOSIS — E78.2 MIXED HYPERLIPIDEMIA: ICD-10-CM

## 2021-06-24 DIAGNOSIS — R73.03 PREDIABETES: ICD-10-CM

## 2021-06-24 DIAGNOSIS — E03.9 ACQUIRED HYPOTHYROIDISM: ICD-10-CM

## 2021-06-24 DIAGNOSIS — E29.1 HYPOGONADISM IN MALE: ICD-10-CM

## 2021-06-24 DIAGNOSIS — I10 ESSENTIAL HYPERTENSION: ICD-10-CM

## 2021-06-24 DIAGNOSIS — D75.1 POLYCYTHEMIA: ICD-10-CM

## 2021-06-24 LAB
ALBUMIN SERPL-MCNC: NORMAL G/DL
ALP BLD-CCNC: NORMAL U/L
ALT SERPL-CCNC: NORMAL U/L
ANION GAP SERPL CALCULATED.3IONS-SCNC: NORMAL MMOL/L
AST SERPL-CCNC: NORMAL U/L
AVERAGE GLUCOSE: NORMAL
BILIRUB SERPL-MCNC: NORMAL MG/DL
BUN BLDV-MCNC: NORMAL MG/DL
CALCIUM SERPL-MCNC: NORMAL MG/DL
CHLORIDE BLD-SCNC: NORMAL MMOL/L
CHOLESTEROL, TOTAL: 148 MG/DL
CHOLESTEROL/HDL RATIO: 3.4
CO2: NORMAL
CREAT SERPL-MCNC: NORMAL MG/DL
CREATININE, URINE: 138
GFR CALCULATED: NORMAL
GLUCOSE BLD-MCNC: NORMAL MG/DL
HBA1C MFR BLD: 6 %
HDLC SERPL-MCNC: 44 MG/DL (ref 35–70)
LDL CHOLESTEROL CALCULATED: 85 MG/DL (ref 0–160)
MICROALBUMIN/CREAT 24H UR: 0.4 MG/G{CREAT}
MICROALBUMIN/CREAT UR-RTO: 3
NONHDLC SERPL-MCNC: 104 MG/DL
POTASSIUM SERPL-SCNC: NORMAL MMOL/L
SODIUM BLD-SCNC: NORMAL MMOL/L
TOTAL PROTEIN: NORMAL
TRIGL SERPL-MCNC: 95 MG/DL
VLDLC SERPL CALC-MCNC: NORMAL MG/DL

## 2021-07-21 DIAGNOSIS — I10 ESSENTIAL HYPERTENSION: ICD-10-CM

## 2021-07-21 RX ORDER — LOSARTAN POTASSIUM 50 MG/1
TABLET ORAL
Qty: 90 TABLET | Refills: 3 | Status: SHIPPED
Start: 2021-07-21 | End: 2022-09-16

## 2021-08-10 ENCOUNTER — OFFICE VISIT (OUTPATIENT)
Dept: FAMILY MEDICINE CLINIC | Age: 76
End: 2021-08-10
Payer: MEDICARE

## 2021-08-10 VITALS
TEMPERATURE: 97.6 F | BODY MASS INDEX: 26.49 KG/M2 | OXYGEN SATURATION: 98 % | HEART RATE: 98 BPM | WEIGHT: 189.2 LBS | HEIGHT: 71 IN | DIASTOLIC BLOOD PRESSURE: 72 MMHG | RESPIRATION RATE: 16 BRPM | SYSTOLIC BLOOD PRESSURE: 136 MMHG

## 2021-08-10 DIAGNOSIS — E29.1 HYPOGONADISM IN MALE: ICD-10-CM

## 2021-08-10 DIAGNOSIS — Z12.5 ENCOUNTER FOR SCREENING FOR MALIGNANT NEOPLASM OF PROSTATE: ICD-10-CM

## 2021-08-10 DIAGNOSIS — E78.2 MIXED HYPERLIPIDEMIA: ICD-10-CM

## 2021-08-10 DIAGNOSIS — I10 ESSENTIAL HYPERTENSION: Primary | ICD-10-CM

## 2021-08-10 DIAGNOSIS — E03.9 ACQUIRED HYPOTHYROIDISM: ICD-10-CM

## 2021-08-10 DIAGNOSIS — R73.03 PREDIABETES: ICD-10-CM

## 2021-08-10 DIAGNOSIS — F98.8 ATTENTION DEFICIT DISORDER (ADD) WITHOUT HYPERACTIVITY: ICD-10-CM

## 2021-08-10 DIAGNOSIS — K21.00 GASTROESOPHAGEAL REFLUX DISEASE WITH ESOPHAGITIS WITHOUT HEMORRHAGE: ICD-10-CM

## 2021-08-10 DIAGNOSIS — D75.1 POLYCYTHEMIA: ICD-10-CM

## 2021-08-10 PROCEDURE — 99214 OFFICE O/P EST MOD 30 MIN: CPT | Performed by: FAMILY MEDICINE

## 2021-08-10 RX ORDER — LEVOTHYROXINE SODIUM 88 MCG
88 TABLET ORAL DAILY
Qty: 90 TABLET | Refills: 3 | Status: SHIPPED
Start: 2021-08-10 | End: 2021-11-16 | Stop reason: SDUPTHER

## 2021-08-10 ASSESSMENT — PATIENT HEALTH QUESTIONNAIRE - PHQ9
SUM OF ALL RESPONSES TO PHQ QUESTIONS 1-9: 1
SUM OF ALL RESPONSES TO PHQ9 QUESTIONS 1 & 2: 1
1. LITTLE INTEREST OR PLEASURE IN DOING THINGS: 1
2. FEELING DOWN, DEPRESSED OR HOPELESS: 0

## 2021-08-10 NOTE — PROGRESS NOTES
Subjective:  70 y.o. y/o male is here for follow up chronic hypertension. This is generally controlled on current medication regimen. Takes meds as directed and tolerates them well. Most recent labs reviewed with patient and are not remarkable. No symptoms from htn standpoint per ROS. Patient is compliant with lifestyle modifications. Patient does not smoke. Comorbid conditions include below. Urology: Dr. Moody Alfonso, hypogonadism, Androgel, +occ phlebotomies,  OV, yearly appt, brother  of RCC, PSA 0.6  Hematology: Dr. 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, +h/o multiple phlebotomies, monitoring small lung nodules,  OV, HCT 45.7 , last visit [place]  ADD: Strattera for years, helps with concentration, still working  Prediabetes: [place], DM eye exam 10/19 [place], Hgb A1C 6.0  Hypothyroidism: TSH on the low side 0.81, + symptoms with last switch to levothyroxine (not planned), always on brand Synthroid previously, Synthroid 88 mcg daily  Hyperlipidemia: LDL 85, taking rosuvastatin 20mg daily, tolerating  The 10-year ASCVD risk score (Edy Ford., et al., 2013) is: 44%    Values used to calculate the score:      Age: 70 years      Sex: Male      Is Non- : No      Diabetic: Yes      Tobacco smoker: No      Systolic Blood Pressure: 301 mmHg      Is BP treated: Yes      HDL Cholesterol: 44 mg/dL      Total Cholesterol: 148 mg/dL  Vit D def: Taking 3000 IU daily, level now normal  Cataracts on right, 23, right cataract surgery  Colonoscopy, 2016, 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 Medications and allergies also reviewed and updated in chart.         Review of Systems:  Constitutional:  No fever, + fatigue, no chills, no headaches, no weight change  Dermatology:  No rash, no mole, no dry or sensitive skin  ENT:  No cough, no sore throat, no sinus pain, no runny nose  Cardiology:  No chest pain, no palpitations, no leg edema, no shortness of breath, no PND  Gastroenterology:  No dysphagia, no abdominal pain, no nausea, no vomiting, no constipation, no diarrhea, no heartburn  Musculoskeletal:  + joint pain, no leg cramps, no back pain, no muscle aches  Neuro:  No dizziness, no numbness/tingling  Respiratory:  No shortness of breath, no orthopnea, no wheezing, no TAPIA  Urology:  No blood in the urine, no urinary frequency, no urinary incontinence, no urinary urgency, no nocturia, no dysuria    Vitals:    08/10/21 1034   BP: 136/72   Site: Left Upper Arm   Position: Sitting   Cuff Size: Medium Adult   Pulse: 98   Resp: 16   Temp: 97.6 °F (36.4 °C)   TempSrc: Temporal   SpO2: 98%   Weight: 189 lb 3.2 oz (85.8 kg)   Height: 5' 11\" (1.803 m)     Body mass index is 26.39 kg/m². General:  Patient alert and oriented x 3, NAD, pleasant, normal-appearing weight  HEENT:  Atraumatic, normocephalic, pupils equal and normal, EOMI, clear conjunctiva, +mask  Neck:  Supple, no goiter, no carotid bruits, no LAD  Lungs:  CTA B, symmetric breath sounds, no resp distress  Heart:  RRR, no murmurs, gallops or rubs  Abdomen:  Soft/nt/nd, + bowel sounds  Extremities:  No clubbing, cyanosis, edema  Skin: unremarkable        Assessment/Plan:    Ignacio Orozco was seen today for hypertension, hypothyroidism and blood sugar problem. Diagnoses and all orders for this visit:    Essential hypertension, controlled  -     CBC Auto Differential; Future  -     Comprehensive Metabolic Panel; Future  + Continue current medication(s) along with dietary and lifestyle modifications. Acquired hypothyroidism, +symptoms  -     SYNTHROID 88 MCG tablet; Take 1 tablet by mouth daily Take with water on an empty stomach- wait 30 minutes before eating or taking other meds.   -     TSH without Reflex; Future  + Switch back to brand Synthroid    Prediabetes, +invokana  -     Hemoglobin A1C; Future  + Continue current medication(s) along with dietary and lifestyle modifications. Mixed hyperlipidemia, controlled  -     Lipid, Fasting; Future  + Continue current medication(s) along with dietary and lifestyle modifications. Attention deficit disorder (ADD) without hyperactivity  Controlled, +working, +continue Strattera    Hypogonadism in male, +Androgel  -     PSA screening; Future  -     TESTOSTERONE; Future  + regular urology f/u, monitor labs  + Sees hematology every 6 months, regular blood \"donation\"    Encounter for screening for malignant neoplasm of prostate   -     PSA screening; Future    Gastroesophageal reflux disease with esophagitis without hemorrhage, +PPI, continue    Polycythemia  Secondary d/t topical testosterone  Regular f/u with hematology        As above. Call or go to ED immediately if symptoms worsen or persist.  Return in about 3 months (around 11/10/2021) for AWV. and 6 months with above lab prior, or sooner if necessary. Educational materials and/or home exercises printed for patient's review and were included in patient instructions on his/her After Visit Summary and given to patient at the end of visit. Counseled regarding above diagnosis, including possible risks and complications,  especially if left uncontrolled. Counseled regarding the possible side effects, risks, benefits and alternatives to treatment; patient and/or guardian verbalizes understanding, agrees, feels comfortable with and wishes to proceed with above treatment plan. Advised patient to call with any new medication issues, and read all Rx info from pharmacy to assure aware of all possible risks and side effects of medication before taking. Reviewed age and gender appropriate health screening exams and vaccinations.   Advised patient regarding importance of keeping up with recommended health maintenance and to schedule as soon as possible if overdue, as this is important in assessing for undiagnosed pathology, especially cancer, as well as protecting against potentially harmful/life threatening disease. Patient and/or guardian verbalizes understanding and agrees with above counseling, assessment and plan. All questions answered.

## 2021-08-12 PROBLEM — F98.8 ATTENTION DEFICIT DISORDER (ADD) WITHOUT HYPERACTIVITY: Status: ACTIVE | Noted: 2021-08-12

## 2021-09-07 DIAGNOSIS — K21.00 REFLUX ESOPHAGITIS: ICD-10-CM

## 2021-09-07 RX ORDER — PANTOPRAZOLE SODIUM 40 MG/1
TABLET, DELAYED RELEASE ORAL
Qty: 36 TABLET | Refills: 3 | Status: SHIPPED
Start: 2021-09-07 | End: 2022-08-08

## 2021-10-31 LAB
ALBUMIN SERPL-MCNC: NORMAL G/DL
ALP BLD-CCNC: NORMAL U/L
ALT SERPL-CCNC: NORMAL U/L
ANION GAP SERPL CALCULATED.3IONS-SCNC: NORMAL MMOL/L
AST SERPL-CCNC: NORMAL U/L
AVERAGE GLUCOSE: NORMAL
BASOPHILS ABSOLUTE: NORMAL
BASOPHILS RELATIVE PERCENT: NORMAL
BILIRUB SERPL-MCNC: NORMAL MG/DL
BUN BLDV-MCNC: NORMAL MG/DL
CALCIUM SERPL-MCNC: NORMAL MG/DL
CHLORIDE BLD-SCNC: NORMAL MMOL/L
CHOLESTEROL, FASTING: 151
CO2: NORMAL
CREAT SERPL-MCNC: NORMAL MG/DL
EOSINOPHILS ABSOLUTE: NORMAL
EOSINOPHILS RELATIVE PERCENT: NORMAL
GFR CALCULATED: NORMAL
GLUCOSE BLD-MCNC: NORMAL MG/DL
HBA1C MFR BLD: 6.2 %
HCT VFR BLD CALC: NORMAL %
HDLC SERPL-MCNC: 44 MG/DL (ref 35–70)
HEMOGLOBIN: NORMAL
LDL CHOLESTEROL CALCULATED: 85 MG/DL (ref 0–160)
LYMPHOCYTES ABSOLUTE: NORMAL
LYMPHOCYTES RELATIVE PERCENT: NORMAL
MCH RBC QN AUTO: NORMAL PG
MCHC RBC AUTO-ENTMCNC: NORMAL G/DL
MCV RBC AUTO: NORMAL FL
MONOCYTES ABSOLUTE: NORMAL
MONOCYTES RELATIVE PERCENT: NORMAL
NEUTROPHILS ABSOLUTE: NORMAL
NEUTROPHILS RELATIVE PERCENT: NORMAL
PDW BLD-RTO: NORMAL %
PLATELET # BLD: NORMAL 10*3/UL
PMV BLD AUTO: NORMAL FL
POTASSIUM SERPL-SCNC: NORMAL MMOL/L
PROSTATE SPECIFIC ANTIGEN: 0.52 NG/ML
RBC # BLD: NORMAL 10*6/UL
SODIUM BLD-SCNC: NORMAL MMOL/L
TESTOSTERONE TOTAL: 600
TOTAL PROTEIN: NORMAL
TRIGLYCERIDE, FASTING: 122
TSH SERPL DL<=0.05 MIU/L-ACNC: 0.83 UIU/ML
WBC # BLD: NORMAL 10*3/UL

## 2021-11-16 ENCOUNTER — OFFICE VISIT (OUTPATIENT)
Dept: FAMILY MEDICINE CLINIC | Age: 76
End: 2021-11-16
Payer: MEDICARE

## 2021-11-16 VITALS
BODY MASS INDEX: 26.46 KG/M2 | WEIGHT: 189 LBS | TEMPERATURE: 98.3 F | RESPIRATION RATE: 16 BRPM | HEART RATE: 92 BPM | OXYGEN SATURATION: 98 % | HEIGHT: 71 IN | DIASTOLIC BLOOD PRESSURE: 74 MMHG | SYSTOLIC BLOOD PRESSURE: 136 MMHG

## 2021-11-16 DIAGNOSIS — R73.03 PREDIABETES: ICD-10-CM

## 2021-11-16 DIAGNOSIS — Z00.00 ROUTINE GENERAL MEDICAL EXAMINATION AT A HEALTH CARE FACILITY: Primary | ICD-10-CM

## 2021-11-16 DIAGNOSIS — E03.9 ACQUIRED HYPOTHYROIDISM: ICD-10-CM

## 2021-11-16 PROCEDURE — G0402 INITIAL PREVENTIVE EXAM: HCPCS | Performed by: FAMILY MEDICINE

## 2021-11-16 RX ORDER — LEVOTHYROXINE SODIUM 88 UG/1
88 TABLET ORAL DAILY
Qty: 90 TABLET | Refills: 3 | Status: SHIPPED
Start: 2021-11-16 | End: 2022-04-13

## 2021-11-16 ASSESSMENT — PATIENT HEALTH QUESTIONNAIRE - PHQ9
SUM OF ALL RESPONSES TO PHQ QUESTIONS 1-9: 2
2. FEELING DOWN, DEPRESSED OR HOPELESS: 1
SUM OF ALL RESPONSES TO PHQ QUESTIONS 1-9: 2
SUM OF ALL RESPONSES TO PHQ9 QUESTIONS 1 & 2: 2
1. LITTLE INTEREST OR PLEASURE IN DOING THINGS: 1
SUM OF ALL RESPONSES TO PHQ QUESTIONS 1-9: 2

## 2021-11-16 ASSESSMENT — LIFESTYLE VARIABLES
HOW OFTEN DO YOU HAVE A DRINK CONTAINING ALCOHOL: 1
AUDIT-C TOTAL SCORE: 1
HOW OFTEN DO YOU HAVE SIX OR MORE DRINKS ON ONE OCCASION: 0
HOW MANY STANDARD DRINKS CONTAINING ALCOHOL DO YOU HAVE ON A TYPICAL DAY: 0

## 2021-11-16 NOTE — PROGRESS NOTES
Diagnosis Date    Family history of renal cell carcinoma     Hypogonadism in male     Hypothyroidism     Vitamin D deficiency        Past Surgical History:   Procedure Laterality Date    CARPAL TUNNEL RELEASE Bilateral 2009    CATARACT REMOVAL WITH IMPLANT Right 2016    CATARACT REMOVAL WITH IMPLANT Left 02/2021    COLONOSCOPY  2016    Dr. Jacobo Nunn         Family History   Problem Relation Age of Onset    Cancer Brother         Renal cell cancer       CareTeam (Including outside providers/suppliers regularly involved in providing care):   Patient Care Team:  Araceli Morales MD as PCP - General (Family Medicine)  Araceli Morales MD as PCP - [facility]  Shirley Zambrano MD as Consulting Physician (Hematology and Oncology)  Dang Yao MD as Consulting Physician (Urology)    Wt Readings from Last 3 Encounters:   11/16/21 189 lb (85.7 kg)   08/10/21 189 lb 3.2 oz (85.8 kg)   12/30/20 189 lb (85.7 kg)     Vitals:    11/16/21 1105   BP: 136/74   Site: Left Upper Arm   Position: Sitting   Cuff Size: Medium Adult   Pulse: 92   Resp: 16   Temp: 98.3 °F (36.8 °C)   TempSrc: Temporal   SpO2: 98%   Weight: 189 lb (85.7 kg)   Height: 5' 11\" (1.803 m)     Body mass index is 26.36 kg/m². Based upon direct observation of the patient, evaluation of cognition reveals recent and remote memory intact. Patient's complete Health Risk Assessment and screening values have been reviewed and are found in Flowsheets. The following problems were reviewed today and where indicated follow up appointments were made and/or referrals ordered. Positive Risk Factor Screenings with Interventions:            General Health and ACP:  General  In general, how would you say your health is?: Very Good  In the past 7 days, have you experienced any of the following?  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, patient declines any further evaluation/treatment for this issue  · No Living Will: Patient referred to ACP Clinical Specialist        Personalized Preventive Plan   Current Health Maintenance Status  Immunization History   Administered Date(s) Administered    COVID-19, Patel Batch, Primary or Immunocompromised, PF, 100mcg/0.5mL 02/06/2021, 03/06/2021, 10/23/2021    Influenza, High Dose (Fluzone 65 yrs and older) 10/03/2015, 09/06/2017    Influenza, Quadv, IM, PF (6 mo and older Fluzone, Flulaval, Fluarix, and 3 yrs and older Afluria) 08/24/2016    Influenza, Quadv, adjuvanted, 65 yrs +, IM, PF (Fluad) 10/15/2020, 10/23/2021    Influenza, Triv, inactivated, subunit, adjuvanted, IM (Fluad 65 yrs and older) 08/31/2018, 08/15/2019    Pneumococcal Conjugate 13-valent (Whypuia22) 02/14/2016    Pneumococcal Polysaccharide ([id number]) 08/24/2016    Tdap (Boostrix, Adacel) 02/14/2016    Zoster Recombinant (Shingrix) 08/31/2018, 12/14/2018        Health Maintenance   Topic Date Due    Hepatitis C screen  Never done    Annual Wellness Visit (AWV)  09/29/2021    A1C test (Diabetic or Prediabetic)  06/24/2022    Lipid screen  06/24/2022    TSH testing  06/24/2022    Potassium monitoring  06/24/2022    Creatinine monitoring  06/24/2022    DTaP/Tdap/Td vaccine (2 - Td or Tdap) 02/14/2026    Colon cancer screen colonoscopy  07/25/2026    Flu vaccine  Completed    Shingles Vaccine  Completed    Pneumococcal 65+ years Vaccine  Completed  COVID-19 Vaccine  Completed    Hepatitis A vaccine  Aged Out    Hib vaccine  Aged Out    Meningococcal (ACWY) vaccine  Aged Out     Recommendations for [employer] Due: see orders and patient instructions/AVS.  . Recommended screening schedule for the next 5-10 years is provided to the patient in written form: see Patient Instructions/AVS.    Linda Donald was seen today for medicare awv. Diagnoses and all orders for this visit:    Routine general medical examination at a [facility] facility  [facility] Referral to [facility] Clinical Specialist  + As above    Acquired hypothyroidism  -     levothyroxine (SYNTHROID) 88 MCG tablet; Take 1 tablet by mouth daily Take with water on an empty stomach- wait 30 minutes before eating or taking other meds. + Switch back to generic (brand a mistake)    Prediabetes  -     dapagliflozin (FARXIGA) 5 MG tablet; Take 1 tablet by mouth every morning  + Adjust d/t change in formulary    Labs done prior to this appt and not available--will review once available. RTC in 3 months for regular visit with no labs prior d/t labs already done.

## 2021-11-16 NOTE — PATIENT INSTRUCTIONS
Personalized Preventive Plan for Amira Draper - 11/16/2021  Medicare offers a range of preventive health benefits. Some of the tests and screenings are paid in full while other may be subject to a deductible, co-insurance, and/or copay. Some of these benefits include a comprehensive review of your medical history including lifestyle, illnesses that may run in your family, and various assessments and screenings as appropriate. After reviewing your medical record and screening and assessments performed today your provider may have ordered immunizations, labs, imaging, and/or referrals for you. A list of these orders (if applicable) as well as your Preventive Care list are included within your After Visit Summary for your review. Other Preventive Recommendations:    · A preventive eye exam performed by an eye specialist is recommended every 1-2 years to screen for glaucoma; cataracts, macular degeneration, and other eye disorders. · A preventive dental visit is recommended every 6 months. · Try to get at least 150 minutes of exercise per week or 10,000 steps per day on a pedometer . · Order or download the FREE \"Exercise & Physical Activity: Your Everyday Guide\" from [employer]. Call 3-517.804.3658 or search [employer] online. · You need 4882-1992 mg of calcium and 3591-6311 IU of vitamin D per day. It is possible to meet your calcium requirement with diet alone, but a vitamin D supplement is usually necessary to meet this goal.  · When exposed to the sun, use a sunscreen that protects against both UVA and UVB radiation with an SPF of 30 or greater. Reapply every 2 to 3 hours or after sweating, drying off with a towel, or swimming. · Always wear a seat belt when traveling in a car. Always wear a helmet when riding a bicycle or motorcycle.

## 2021-11-17 ENCOUNTER — CLINICAL DOCUMENTATION (OUTPATIENT)
Dept: SPIRITUAL SERVICES | Age: 76
End: 2021-11-17

## 2021-11-23 ENCOUNTER — CLINICAL DOCUMENTATION (OUTPATIENT)
Dept: SPIRITUAL SERVICES | Age: 76
End: 2021-11-23

## 2021-11-23 NOTE — PROGRESS NOTES
Advance Care Planning   Ambulatory ACP Specialist Patient Outreach    Date:  11/23/2021  ACP Specialist:  Rev Edda Germain    Outreach call to patient in follow-up to ACP Specialist referral from: Gregory Naranjo MD    [x] PCP  [] Provider   [] Ambulatory Care Management [] Other for Reason:    [x] Advance Directive Assistance  [] Code Status Discussion  [] Complete Portable DNR Order  [] Discuss Goals of Care  [] Complete POST/MOST  [] Early ACP Decision-Making  [] Other    Date Referral Received: Today's Outreach:  [] First   [x] Second  [] Third                               Third outreach made by []  phone  [] email []   "[employer]     Intervention:  [x] Spoke with Patient  [] Left VM requesting return call      Outcome: Scheduled day and time for Patient to come to hospital and fill out ACP docs. Next Step:   [x] ACP scheduled conversation  [] Outreach again in one week               [] Email / Mail ACP Info Sheets  [] Email / Mail Advance Directive            [] Close Referral. Routing closure to referring provider/staff and to ACP Specialist .      Thank you for this referral.

## 2021-11-23 NOTE — PROGRESS NOTES
Advance Care Planning   Ambulatory ACP Specialist Patient Outreach    Date:  11/23/2021  ACP Specialist:  Rev Edda Germain    Outreach call to patient in follow-up to ACP Specialist referral from: Alfredo Gore MD    [x] PCP  [] Provider   [] Ambulatory Care Management [] Other for Reason:    [x] Advance Directive Assistance  [] Code Status Discussion  [] Complete Portable DNR Order  [] Discuss Goals of Care  [] Complete POST/MOST  [] Early ACP Decision-Making  [] Other    Date Referral Received: 11/16/2021    Today's Outreach:  [] First   [x] Second  [] Third                               Third outreach made by []  phone  [] email []   [employer]     Intervention:  [] Spoke with Patient  [x] Left VM requesting return call      Outcome: Call in one week. Next Step:   [] ACP scheduled conversation  [x] Outreach again in one week               [] Email / Mail ACP Info Sheets  [] Email / Mail Advance Directive            [] Close Referral. Routing closure to referring provider/staff and to ACP Specialist .      Thank you for this referral.

## 2021-11-26 DIAGNOSIS — E29.1 HYPOGONADISM IN MALE: ICD-10-CM

## 2021-11-26 DIAGNOSIS — I10 ESSENTIAL HYPERTENSION: ICD-10-CM

## 2021-11-26 DIAGNOSIS — R73.03 PREDIABETES: ICD-10-CM

## 2021-11-26 DIAGNOSIS — Z12.5 ENCOUNTER FOR SCREENING FOR MALIGNANT NEOPLASM OF PROSTATE: ICD-10-CM

## 2021-11-26 DIAGNOSIS — E78.2 MIXED HYPERLIPIDEMIA: ICD-10-CM

## 2021-11-26 DIAGNOSIS — E03.9 ACQUIRED HYPOTHYROIDISM: ICD-10-CM

## 2021-12-07 ENCOUNTER — CLINICAL DOCUMENTATION (OUTPATIENT)
Dept: SPIRITUAL SERVICES | Age: 76
End: 2021-12-07

## 2021-12-07 NOTE — PROGRESS NOTES
Advance Care Planning   Ambulatory ACP Specialist Patient Outreach    Date:  12/7/2021  ACP Specialist:  Rev Edda Germain    Outreach call to patient in follow-up to ACP Specialist referral from: Bijan Walker MD    [x] PCP  [] Provider   [] Ambulatory Care Management [] Other for Reason:    [x] Advance Directive Assistance  [] Code Status Discussion  [] Complete Portable DNR Order  [] Discuss Goals of Care  [] Complete POST/MOST  [] Early ACP Decision-Making  [] Other    Date Referral Received: 11/16/2021  Today's Outreach:  [] First   [] Second  [x] Third                               Third outreach made by [x]  phone  [] email []   [employer]     Intervention:  [x] Spoke with Patient  [] Left VM requesting return call      Outcome: Scheduled time for Patient to come to hospital and fill out ACP documents. Next Step:   [x] ACP scheduled conversation  [] Outreach again in one week               [] Email / Mail ACP Info Sheets  [] Email / Mail Advance Directive            [] Close Referral. Routing closure to referring provider/staff and to ACP Specialist .      Thank you for this referral.

## 2021-12-13 ENCOUNTER — CLINICAL DOCUMENTATION (OUTPATIENT)
Dept: SPIRITUAL SERVICES | Age: 76
End: 2021-12-13

## 2021-12-13 NOTE — PROGRESS NOTES
Advance Care Planning   Ambulatory ACP Specialist Patient Outreach    Date:  12/13/2021  ACP Specialist:  Rev Edda Germain    Outreach call to patient in follow-up to ACP Specialist referral from: Roberto Villegas MD    [x] PCP  [] Provider   [] Ambulatory Care Management [] Other for Reason:    [x] Advance Directive Assistance  [] Code Status Discussion  [] Complete Portable DNR Order  [] Discuss Goals of Care  [] Complete POST/MOST  [] Early ACP Decision-Making  [] Other    Date Referral Received: 11/16     Today's Outreach:  [] First   [] Second  [] Third                               Third outreach made by []  phone  [] email []   Heidi Shaulist     Intervention:  [] Spoke with Patient  [] Left VM requesting return call      Outcome: Patient left voicemail message cancelling his scheduled appointment to fill out ACP documents. Patient to call back after Louisa to reschedule. Next Step:   [] ACP scheduled conversation  [] Outreach again in one week               [] Email / Mail ACP Info Sheets  [] Email / Mail Advance Directive            [] Close Referral. Routing closure to referring provider/staff and to ACP Specialist .      Thank you for this referral.

## 2022-01-11 DIAGNOSIS — F90.0 ATTENTION DEFICIT HYPERACTIVITY DISORDER (ADHD), PREDOMINANTLY INATTENTIVE TYPE: ICD-10-CM

## 2022-01-11 RX ORDER — ATOMOXETINE 40 MG/1
CAPSULE ORAL
Qty: 180 CAPSULE | Refills: 3 | Status: SHIPPED
Start: 2022-01-11 | End: 2022-10-17 | Stop reason: SDUPTHER

## 2022-01-19 ENCOUNTER — CLINICAL DOCUMENTATION (OUTPATIENT)
Dept: SPIRITUAL SERVICES | Age: 77
End: 2022-01-19

## 2022-01-19 NOTE — PROGRESS NOTES
Advance Care Planning   Ambulatory ACP Specialist Patient Outreach    Date:  1/19/2022  ACP Specialist:  Rev Edda Germain    Outreach call to patient in follow-up to ACP Specialist referral from: Roselia Woodruff MD    [x] PCP  [] Provider   [] Ambulatory Care Management [] Other for Reason:    [x] Advance Directive Assistance  [] Code Status Discussion  [] Complete Portable DNR Order  [] Discuss Goals of Care  [] Complete POST/MOST  [] Early ACP Decision-Making  [] Other    Date Referral Received: 11/16/2021    Today's Outreach:  [] First   [] Second  [x] Third                               Third outreach made by [x]  phone  [] email []   [email or web address]     Intervention:  [x] Spoke with Patient  [] Left VM requesting return call      Outcome: Patient scheduled appointment to fill out ACP documents, with contingency that he may not be able to make it on Thursday (1/13), but would come in on Friday (1/14). Patient never showed either day. This situation has been ongoing since Nov. 23. Patient agrees to a date, then calls and cancels. This was the fifth attempt at having the patient schedule and no show/cancel. Next Step:   [x] ACP scheduled conversation  [] Outreach again in one week               [] Email / Mail ACP Info Sheets  [] Email / Mail Advance Directive            [x] Close Referral. Routing closure to referring provider/staff and to ACP Specialist . Thank you for your referral and the opportunity to assist this patient with ACP. As we transition all ACP referrals to the [facility] to ACP Clinical Specialist referral pathway, we thank you in advance for sending all ACP requests to us through the new pathway.

## 2022-03-24 ENCOUNTER — TELEPHONE (OUTPATIENT)
Dept: FAMILY MEDICINE CLINIC | Age: 77
End: 2022-03-24

## 2022-03-24 DIAGNOSIS — R73.03 PREDIABETES: Primary | ICD-10-CM

## 2022-03-24 DIAGNOSIS — E78.2 MIXED HYPERLIPIDEMIA: ICD-10-CM

## 2022-03-24 DIAGNOSIS — I10 ESSENTIAL HYPERTENSION: ICD-10-CM

## 2022-03-24 NOTE — TELEPHONE ENCOUNTER
Since his appointment is later than originally planned, will go ahead and have him get labs prior to his appointment. New orders placed. Thanks.

## 2022-03-24 NOTE — TELEPHONE ENCOUNTER
Patient wonders if he needs labwork prior to appointment on April 11th. Please leave patient message stating \"yes\" or \"no\" for him to  orders.

## 2022-03-31 DIAGNOSIS — I10 ESSENTIAL HYPERTENSION: ICD-10-CM

## 2022-03-31 DIAGNOSIS — R73.03 PREDIABETES: ICD-10-CM

## 2022-03-31 DIAGNOSIS — E78.2 MIXED HYPERLIPIDEMIA: ICD-10-CM

## 2022-03-31 LAB
ALBUMIN SERPL-MCNC: NORMAL G/DL
ALP BLD-CCNC: NORMAL U/L
ALT SERPL-CCNC: NORMAL U/L
ANION GAP SERPL CALCULATED.3IONS-SCNC: NORMAL MMOL/L
AST SERPL-CCNC: NORMAL U/L
AVERAGE GLUCOSE: NORMAL
BASOPHILS ABSOLUTE: NORMAL
BASOPHILS RELATIVE PERCENT: NORMAL
BILIRUB SERPL-MCNC: NORMAL MG/DL
BUN BLDV-MCNC: NORMAL MG/DL
CALCIUM SERPL-MCNC: NORMAL MG/DL
CHLORIDE BLD-SCNC: NORMAL MMOL/L
CHOLESTEROL, FASTING: 137
CO2: NORMAL
CREAT SERPL-MCNC: NORMAL MG/DL
CREATININE, URINE: 132
EOSINOPHILS ABSOLUTE: NORMAL
EOSINOPHILS RELATIVE PERCENT: NORMAL
GFR CALCULATED: NORMAL
GLUCOSE BLD-MCNC: NORMAL MG/DL
HBA1C MFR BLD: 6.2 %
HCT VFR BLD CALC: NORMAL %
HDLC SERPL-MCNC: 45 MG/DL (ref 35–70)
HEMOGLOBIN: NORMAL
LDL CHOLESTEROL CALCULATED: 76 MG/DL (ref 0–160)
LYMPHOCYTES ABSOLUTE: NORMAL
LYMPHOCYTES RELATIVE PERCENT: NORMAL
MCH RBC QN AUTO: NORMAL PG
MCHC RBC AUTO-ENTMCNC: NORMAL G/DL
MCV RBC AUTO: NORMAL FL
MICROALBUMIN/CREAT 24H UR: 0.7 MG/G{CREAT}
MICROALBUMIN/CREAT UR-RTO: 5
MONOCYTES ABSOLUTE: NORMAL
MONOCYTES RELATIVE PERCENT: NORMAL
NEUTROPHILS ABSOLUTE: NORMAL
NEUTROPHILS RELATIVE PERCENT: NORMAL
PDW BLD-RTO: NORMAL %
PLATELET # BLD: NORMAL 10*3/UL
PMV BLD AUTO: NORMAL FL
POTASSIUM SERPL-SCNC: NORMAL MMOL/L
RBC # BLD: NORMAL 10*6/UL
SODIUM BLD-SCNC: NORMAL MMOL/L
TOTAL PROTEIN: NORMAL
TRIGLYCERIDE, FASTING: 81
TSH SERPL DL<=0.05 MIU/L-ACNC: 1.24 UIU/ML
WBC # BLD: NORMAL 10*3/UL

## 2022-04-11 ENCOUNTER — OFFICE VISIT (OUTPATIENT)
Dept: FAMILY MEDICINE CLINIC | Age: 77
End: 2022-04-11
Payer: MEDICARE

## 2022-04-11 VITALS
WEIGHT: 191.4 LBS | DIASTOLIC BLOOD PRESSURE: 78 MMHG | TEMPERATURE: 97.9 F | BODY MASS INDEX: 26.8 KG/M2 | SYSTOLIC BLOOD PRESSURE: 118 MMHG | HEART RATE: 56 BPM | HEIGHT: 71 IN | OXYGEN SATURATION: 96 % | RESPIRATION RATE: 16 BRPM

## 2022-04-11 DIAGNOSIS — E78.2 MIXED HYPERLIPIDEMIA: ICD-10-CM

## 2022-04-11 DIAGNOSIS — D75.1 POLYCYTHEMIA: ICD-10-CM

## 2022-04-11 DIAGNOSIS — E87.5 SERUM POTASSIUM ELEVATED: ICD-10-CM

## 2022-04-11 DIAGNOSIS — K21.00 GASTROESOPHAGEAL REFLUX DISEASE WITH ESOPHAGITIS WITHOUT HEMORRHAGE: ICD-10-CM

## 2022-04-11 DIAGNOSIS — R73.03 PREDIABETES: ICD-10-CM

## 2022-04-11 DIAGNOSIS — F98.8 ATTENTION DEFICIT DISORDER (ADD) WITHOUT HYPERACTIVITY: ICD-10-CM

## 2022-04-11 DIAGNOSIS — I10 ESSENTIAL HYPERTENSION: Primary | ICD-10-CM

## 2022-04-11 DIAGNOSIS — E29.1 HYPOGONADISM IN MALE: ICD-10-CM

## 2022-04-11 DIAGNOSIS — Z12.5 ENCOUNTER FOR SCREENING FOR MALIGNANT NEOPLASM OF PROSTATE: ICD-10-CM

## 2022-04-11 DIAGNOSIS — E03.9 ACQUIRED HYPOTHYROIDISM: ICD-10-CM

## 2022-04-11 LAB
ANION GAP SERPL CALCULATED.3IONS-SCNC: 10 MMOL/L (ref 7–16)
BUN BLDV-MCNC: 15 MG/DL (ref 6–23)
CALCIUM SERPL-MCNC: 9.8 MG/DL (ref 8.6–10.2)
CHLORIDE BLD-SCNC: 102 MMOL/L (ref 98–107)
CO2: 27 MMOL/L (ref 22–29)
CREAT SERPL-MCNC: 1.1 MG/DL (ref 0.7–1.2)
GFR AFRICAN AMERICAN: >60
GFR NON-AFRICAN AMERICAN: >60 ML/MIN/1.73
GLUCOSE BLD-MCNC: 126 MG/DL (ref 74–99)
POTASSIUM SERPL-SCNC: 4.8 MMOL/L (ref 3.5–5)
SODIUM BLD-SCNC: 139 MMOL/L (ref 132–146)

## 2022-04-11 PROCEDURE — 99214 OFFICE O/P EST MOD 30 MIN: CPT | Performed by: FAMILY MEDICINE

## 2022-04-11 RX ORDER — CANAGLIFLOZIN 300 MG/1
150 TABLET, FILM COATED ORAL
COMMUNITY

## 2022-04-11 NOTE — PROGRESS NOTES
Subjective:  78 y.o. y/o male is here for follow up chronic hypertension. This is generally controlled on current medication regimen. Takes meds as directed and tolerates them well. Most recent labs reviewed with patient , +K elevated. No symptoms from htn standpoint per ROS. Patient is compliant with lifestyle modifications. Patient does not smoke. Comorbid conditions include below. Urology: Dr. Simpson Homans, hypogonadism, Androgel, +occ phlebotomies, 3/22 OV, yearly appt, brother  of RCC, PSA 0.52  Hematology: Dr. 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, +h/o multiple phlebotomies, monitoring small lung nodules, 3/22 OV, HCT 50.6, last visit OK and no phlebotomy rec'd  ADD: Strattera for years, helps with concentration, still working  Prediabetes: Lacinda Sinks (continuing until bottle finishes and then will start [address], +formulary change), DM eye exam  OK, Hgb A1C 6.2  Hypothyroidism: TSH 1.24, Synthroid 88 mcg daily  Hyperlipidemia: LDL 76, taking rosuvastatin 20mg daily, tolerating  The 10-year ASCVD risk score (Bran Frazier., et al., 2013) is: 37.3%*    Values used to calculate the score:      Age: 78 years      Sex: Male      Is Non- : No      Diabetic: Yes      Tobacco smoker: No      Systolic Blood Pressure: 513 mmHg      Is BP treated: Yes      HDL Cholesterol: 45 mg/dL      Total Cholesterol: 137 mg/dL*      * - Cholesterol units were assumed for this score calculation  Vit D def: Taking 3000 IU daily, level now normal  Cataracts on right, 23, right cataract surgery  Colonoscopy, 2016, 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 Medications and allergies also reviewed and updated in chart.         Review of Systems:  Constitutional:  No fever, + fatigue, no chills, no headaches, no weight change  Dermatology:  No rash, no mole, no dry or sensitive skin  ENT:  No cough, no sore throat, no sinus pain, no runny nose  Cardiology:  No chest pain, no palpitations, no leg edema, no shortness of breath, no PND  Gastroenterology:  No dysphagia, no abdominal pain, no nausea, no vomiting, no constipation, no diarrhea, no heartburn  Musculoskeletal:  + joint pain, no leg cramps, no back pain, no muscle aches  Neuro:  No dizziness, no numbness/tingling  Respiratory:  No shortness of breath, no orthopnea, no wheezing, no TAPIA  Urology:  No blood in the urine, no urinary frequency, no urinary incontinence, no urinary urgency, no nocturia, no dysuria    Vitals:    04/11/22 0911   BP: 118/78   Site: Left Upper Arm   Position: Sitting   Cuff Size: Medium Adult   Pulse: 56   Resp: 16   Temp: 97.9 °F (36.6 °C)   TempSrc: Temporal   SpO2: 96%   Weight: 191 lb 6.4 oz (86.8 kg)   Height: 5' 11\" (1.803 m)     Body mass index is 26.69 kg/m². General:  Patient alert and oriented x 3, NAD, pleasant, normal-appearing weight  HEENT:  Atraumatic, normocephalic, pupils equal and normal, EOMI, clear conjunctiva, +mask  Neck:  Supple, no goiter, no carotid bruits, no LAD  Lungs:  CTA B, symmetric breath sounds, no resp distress  Heart:  RRR, no murmurs, gallops or rubs  Abdomen:  Soft/nt/nd, + bowel sounds  Extremities:  No clubbing, cyanosis, edema  Skin: unremarkable        Assessment/Plan:    Umu Tong was seen today for hypertension, hypothyroidism, hypogonadism, gastroesophageal reflux and memory loss. Diagnoses and all orders for this visit:    Essential hypertension, controlled  -     CBC with Auto Differential; Future  -     Comprehensive Metabolic Panel; Future  + Continue current medication(s) along with dietary and lifestyle modifications. Mixed hyperlipidemia, controlled  -     Lipid, Fasting;  Future  + Continue current medication(s) along with dietary and lifestyle modifications. Prediabetes, controlled, +SGLT2 inhibitor  -     Hemoglobin A1C; Future  + Continue current medication(s) along with dietary and lifestyle modifications. + Will switch to [place] from [employer] once bottle completed    Acquired hypothyroidism, controlled, continue same  -     TSH; Future  -     SYNTHROID 88 MCG tablet; Take 1 tablet by mouth daily Take with water on an empty stomach- wait 30 minutes before eating or taking other meds. Hypogonadism in male, +Androgel  -     TESTOSTERONE; Future  -     PSA Screening; Future  + Continue yearly f/u with urology  + Helps with fatigue    Polycythemia, +secondary d/t Androgel  + Occ phlebotomy needed but desires to stay with Androgel  + Yearly f/u with urology    Attention deficit disorder (ADD) without hyperactivity  + Strattera, does well, helpful    Gastroesophageal reflux disease with esophagitis without hemorrhage, +PPI  Continue same, avoid food triggers    Serum potassium elevated, recheck today  -     Basic Metabolic Panel; Future    Encounter for screening for malignant neoplasm of prostate  -     PSA Screening; Future      BMP today. As above. Call or go to ED immediately if symptoms worsen or persist.  Return in about 6 months (around 10/11/2022). with above lab prior, or sooner if necessary. Educational materials and/or home exercises printed for patient's review and were included in patient instructions on his/her After Visit Summary and given to patient at the end of visit. Counseled regarding above diagnosis, including possible risks and complications,  especially if left uncontrolled. Counseled regarding the possible side effects, risks, benefits and alternatives to treatment; patient and/or guardian verbalizes understanding, agrees, feels comfortable with and wishes to proceed with above treatment plan.     Advised patient to call with any new medication issues, and read all Rx info from pharmacy to assure aware of all possible risks and side effects of medication before taking. Reviewed age and gender appropriate health screening exams and vaccinations. Advised patient regarding importance of keeping up with recommended health maintenance and to schedule as soon as possible if overdue, as this is important in assessing for undiagnosed pathology, especially cancer, as well as protecting against potentially harmful/life threatening disease. Patient and/or guardian verbalizes understanding and agrees with above counseling, assessment and plan. All questions answered.

## 2022-04-13 RX ORDER — LEVOTHYROXINE SODIUM 88 MCG
88 TABLET ORAL DAILY
Qty: 90 TABLET | Refills: 3
Start: 2022-04-13 | End: 2022-10-17 | Stop reason: SDUPTHER

## 2022-05-02 DIAGNOSIS — E78.2 MIXED HYPERLIPIDEMIA: ICD-10-CM

## 2022-05-02 RX ORDER — ROSUVASTATIN CALCIUM 20 MG/1
TABLET, COATED ORAL
Qty: 90 TABLET | Refills: 3 | Status: SHIPPED | OUTPATIENT
Start: 2022-05-02

## 2022-05-04 ENCOUNTER — TELEPHONE (OUTPATIENT)
Dept: FAMILY MEDICINE CLINIC | Age: 77
End: 2022-05-04

## 2022-05-04 NOTE — TELEPHONE ENCOUNTER
Pt would like to know from Dr Jesse Norris if she thinks he should get his second booster for covid vaccine due to his age and health conditions.  Please call pt and advise

## 2022-05-04 NOTE — TELEPHONE ENCOUNTER
Due to his age and medical conditions, he is at higher risk with COVID and may benefit from a 2nd booster. Thanks.

## 2022-08-08 DIAGNOSIS — K21.00 REFLUX ESOPHAGITIS: ICD-10-CM

## 2022-08-08 RX ORDER — PANTOPRAZOLE SODIUM 40 MG/1
TABLET, DELAYED RELEASE ORAL
Qty: 36 TABLET | Refills: 3 | Status: SHIPPED | OUTPATIENT
Start: 2022-08-08

## 2022-09-16 DIAGNOSIS — I10 ESSENTIAL HYPERTENSION: ICD-10-CM

## 2022-09-16 RX ORDER — LOSARTAN POTASSIUM 50 MG/1
TABLET ORAL
Qty: 90 TABLET | Refills: 3 | Status: SHIPPED | OUTPATIENT
Start: 2022-09-16

## 2022-10-07 LAB
ALBUMIN SERPL-MCNC: NORMAL G/DL
ALP BLD-CCNC: NORMAL U/L
ALT SERPL-CCNC: NORMAL U/L
ANION GAP SERPL CALCULATED.3IONS-SCNC: NORMAL MMOL/L
AST SERPL-CCNC: NORMAL U/L
AVERAGE GLUCOSE: NORMAL
BASOPHILS ABSOLUTE: NORMAL
BASOPHILS RELATIVE PERCENT: NORMAL
BILIRUB SERPL-MCNC: NORMAL MG/DL
BUN BLDV-MCNC: NORMAL MG/DL
CALCIUM SERPL-MCNC: NORMAL MG/DL
CHLORIDE BLD-SCNC: NORMAL MMOL/L
CHOLESTEROL, FASTING: 133
CO2: NORMAL
CREAT SERPL-MCNC: NORMAL MG/DL
EOSINOPHILS ABSOLUTE: NORMAL
EOSINOPHILS RELATIVE PERCENT: NORMAL
GFR CALCULATED: NORMAL
GLUCOSE BLD-MCNC: NORMAL MG/DL
HBA1C MFR BLD: 5.9 %
HCT VFR BLD CALC: NORMAL %
HDLC SERPL-MCNC: 40 MG/DL (ref 35–70)
HEMOGLOBIN: NORMAL
LDL CHOLESTEROL CALCULATED: 75 MG/DL (ref 0–160)
LYMPHOCYTES ABSOLUTE: NORMAL
LYMPHOCYTES RELATIVE PERCENT: NORMAL
MCH RBC QN AUTO: NORMAL PG
MCHC RBC AUTO-ENTMCNC: NORMAL G/DL
MCV RBC AUTO: NORMAL FL
MONOCYTES ABSOLUTE: NORMAL
MONOCYTES RELATIVE PERCENT: NORMAL
NEUTROPHILS ABSOLUTE: NORMAL
NEUTROPHILS RELATIVE PERCENT: NORMAL
PDW BLD-RTO: NORMAL %
PLATELET # BLD: NORMAL 10*3/UL
PMV BLD AUTO: NORMAL FL
POTASSIUM SERPL-SCNC: NORMAL MMOL/L
PROSTATE SPECIFIC ANTIGEN: 0.6 NG/ML
RBC # BLD: NORMAL 10*6/UL
SODIUM BLD-SCNC: NORMAL MMOL/L
TESTOSTERONE TOTAL: 529
TOTAL PROTEIN: NORMAL
TRIGLYCERIDE, FASTING: 95
TSH SERPL DL<=0.05 MIU/L-ACNC: 1.67 UIU/ML
WBC # BLD: NORMAL 10*3/UL

## 2022-10-10 DIAGNOSIS — E29.1 HYPOGONADISM IN MALE: ICD-10-CM

## 2022-10-10 DIAGNOSIS — Z12.5 ENCOUNTER FOR SCREENING FOR MALIGNANT NEOPLASM OF PROSTATE: ICD-10-CM

## 2022-10-10 DIAGNOSIS — R73.03 PREDIABETES: ICD-10-CM

## 2022-10-10 DIAGNOSIS — E78.2 MIXED HYPERLIPIDEMIA: ICD-10-CM

## 2022-10-10 DIAGNOSIS — I10 ESSENTIAL HYPERTENSION: ICD-10-CM

## 2022-10-10 DIAGNOSIS — E03.9 ACQUIRED HYPOTHYROIDISM: ICD-10-CM

## 2022-10-17 ENCOUNTER — OFFICE VISIT (OUTPATIENT)
Dept: FAMILY MEDICINE CLINIC | Age: 77
End: 2022-10-17
Payer: MEDICARE

## 2022-10-17 VITALS
DIASTOLIC BLOOD PRESSURE: 76 MMHG | WEIGHT: 188 LBS | HEART RATE: 89 BPM | SYSTOLIC BLOOD PRESSURE: 132 MMHG | RESPIRATION RATE: 18 BRPM | HEIGHT: 71 IN | OXYGEN SATURATION: 96 % | BODY MASS INDEX: 26.32 KG/M2

## 2022-10-17 DIAGNOSIS — E29.1 HYPOGONADISM IN MALE: ICD-10-CM

## 2022-10-17 DIAGNOSIS — E03.9 ACQUIRED HYPOTHYROIDISM: ICD-10-CM

## 2022-10-17 DIAGNOSIS — E78.2 MIXED HYPERLIPIDEMIA: ICD-10-CM

## 2022-10-17 DIAGNOSIS — R73.03 PREDIABETES: ICD-10-CM

## 2022-10-17 DIAGNOSIS — I10 ESSENTIAL HYPERTENSION: Primary | ICD-10-CM

## 2022-10-17 DIAGNOSIS — F90.0 ATTENTION DEFICIT HYPERACTIVITY DISORDER (ADHD), PREDOMINANTLY INATTENTIVE TYPE: ICD-10-CM

## 2022-10-17 PROCEDURE — 1123F ACP DISCUSS/DSCN MKR DOCD: CPT | Performed by: FAMILY MEDICINE

## 2022-10-17 PROCEDURE — 99214 OFFICE O/P EST MOD 30 MIN: CPT | Performed by: FAMILY MEDICINE

## 2022-10-17 RX ORDER — ATOMOXETINE 40 MG/1
CAPSULE ORAL
Qty: 180 CAPSULE | Refills: 3 | Status: SHIPPED | OUTPATIENT
Start: 2022-12-14

## 2022-10-17 RX ORDER — LEVOTHYROXINE SODIUM 88 MCG
88 TABLET ORAL DAILY
Qty: 90 TABLET | Refills: 3 | Status: SHIPPED
Start: 2022-10-17 | End: 2022-10-24

## 2022-10-17 SDOH — ECONOMIC STABILITY: FOOD INSECURITY: WITHIN THE PAST 12 MONTHS, THE FOOD YOU BOUGHT JUST DIDN'T LAST AND YOU DIDN'T HAVE MONEY TO GET MORE.: NEVER TRUE

## 2022-10-17 SDOH — ECONOMIC STABILITY: FOOD INSECURITY: WITHIN THE PAST 12 MONTHS, YOU WORRIED THAT YOUR FOOD WOULD RUN OUT BEFORE YOU GOT MONEY TO BUY MORE.: NEVER TRUE

## 2022-10-17 ASSESSMENT — PATIENT HEALTH QUESTIONNAIRE - PHQ9
2. FEELING DOWN, DEPRESSED OR HOPELESS: 0
DEPRESSION UNABLE TO ASSESS: FUNCTIONAL CAPACITY MOTIVATION LIMITS ACCURACY
SUM OF ALL RESPONSES TO PHQ9 QUESTIONS 1 & 2: 0
SUM OF ALL RESPONSES TO PHQ QUESTIONS 1-9: 0
1. LITTLE INTEREST OR PLEASURE IN DOING THINGS: 0

## 2022-10-17 ASSESSMENT — SOCIAL DETERMINANTS OF HEALTH (SDOH): HOW HARD IS IT FOR YOU TO PAY FOR THE VERY BASICS LIKE FOOD, HOUSING, MEDICAL CARE, AND HEATING?: NOT HARD AT ALL

## 2022-10-17 NOTE — PROGRESS NOTES
weight change  Dermatology:  No rash, no mole, no dry or sensitive skin  ENT:  No cough, no sore throat, no sinus pain, no runny nose  Cardiology:  No chest pain, no palpitations, no leg edema, no shortness of breath, no PND  Gastroenterology:  No dysphagia, no abdominal pain, no nausea, no vomiting, no constipation, no diarrhea, no heartburn  Musculoskeletal:  + joint pain, no leg cramps, no back pain, no muscle aches  Neuro:  No dizziness, no numbness/tingling  Respiratory:  No shortness of breath, no orthopnea, no wheezing, no TAPIA  Urology:  No blood in the urine, no urinary frequency, no urinary incontinence, no urinary urgency, no nocturia, no dysuria    Vitals:    10/17/22 0907 10/17/22 0939   BP: (!) 145/76 132/76   Pulse: 89    Resp: 18    SpO2: 96%    Weight: 188 lb (85.3 kg)    Height: 5' 11\" (1.803 m)      Body mass index is 26.22 kg/m². General:  Patient alert and oriented x 3, NAD, pleasant, normal-appearing weight  HEENT:  Atraumatic, normocephalic, pupils equal and normal, EOMI, clear conjunctiva, +mask  Neck:  Supple, no goiter, no carotid bruits, no LAD  Lungs:  CTA B, symmetric breath sounds, no resp distress  Heart:  RRR, no murmurs, gallops or rubs  Abdomen:  Soft/nt/nd, + bowel sounds  Extremities:  No clubbing, cyanosis, edema  Skin: unremarkable        Assessment/Plan:    Debbi Cooper was seen today for hypertension and follow-up. Diagnoses and all orders for this visit:    Essential hypertension, controlled  -     CBC with Auto Differential; Future  -     Comprehensive Metabolic Panel; Future  -     Microalbumin / Creatinine Urine Ratio; Future  + Continue current medication(s) along with dietary and lifestyle modifications. Acquired hypothyroidism, no symptoms, TSH at goal  -     SYNTHROID 88 MCG tablet; Take 1 tablet by mouth daily Take with water on an empty stomach- wait 30 minutes before eating or taking other meds. -     TSH;  Future  + Continue same dosing    Prediabetes, stable, +SGLT2 inhibitor  -     dapagliflozin (FARXIGA) 5 MG tablet; Take 1 tablet by mouth every morning  -     Hemoglobin A1C; Future  + Continue current medication(s) along with dietary and lifestyle modifications. Attention deficit hyperactivity disorder (ADHD), predominantly inattentive type, controlled, +working, +helpful, +QOL  -     atomoxetine (STRATTERA) 40 MG capsule; TAKE 1 CAPSULE TWICE A DAY  + Continue same    Mixed hyperlipidemia, controlled  -     Lipid, Fasting; Future  + Continue current medication(s) along with dietary and lifestyle modifications. Hypogonadism in male, +androgel  Testosterone at goal, PSA OK, H/H ok  Yearly f/u with urology  Appt this week with hematology        As above. Call or go to ED immediately if symptoms worsen or persist.  Return in about 7 weeks (around 12/7/2022) for AWV. And then 6 months with above lab prior (outside facility), or sooner if necessary. Educational materials and/or home exercises printed for patient's review and were included in patient instructions on his/her After Visit Summary and given to patient at the end of visit. Counseled regarding above diagnosis, including possible risks and complications,  especially if left uncontrolled. Counseled regarding the possible side effects, risks, benefits and alternatives to treatment; patient and/or guardian verbalizes understanding, agrees, feels comfortable with and wishes to proceed with above treatment plan. Advised patient to call with any new medication issues, and read all Rx info from pharmacy to assure aware of all possible risks and side effects of medication before taking. Reviewed age and gender appropriate health screening exams and vaccinations.   Advised patient regarding importance of keeping up with recommended health maintenance and to schedule as soon as possible if overdue, as this is important in assessing for undiagnosed pathology, especially cancer, as well as protecting against potentially harmful/life threatening disease. Patient and/or guardian verbalizes understanding and agrees with above counseling, assessment and plan. All questions answered.

## 2022-10-24 RX ORDER — LEVOTHYROXINE SODIUM 88 UG/1
88 TABLET ORAL DAILY
Qty: 90 TABLET | Refills: 3 | Status: SHIPPED | OUTPATIENT
Start: 2022-10-24

## 2022-10-24 NOTE — TELEPHONE ENCOUNTER
Script was sent in as TRACY and insurance won't cover     Please resent as generic     Pending your approval

## 2022-12-14 ENCOUNTER — OFFICE VISIT (OUTPATIENT)
Dept: FAMILY MEDICINE CLINIC | Age: 77
End: 2022-12-14
Payer: MEDICARE

## 2022-12-14 VITALS
BODY MASS INDEX: 26.32 KG/M2 | TEMPERATURE: 98.1 F | WEIGHT: 188 LBS | DIASTOLIC BLOOD PRESSURE: 64 MMHG | HEIGHT: 71 IN | OXYGEN SATURATION: 97 % | SYSTOLIC BLOOD PRESSURE: 110 MMHG | RESPIRATION RATE: 16 BRPM | HEART RATE: 84 BPM

## 2022-12-14 DIAGNOSIS — J10.1 INFLUENZA A: ICD-10-CM

## 2022-12-14 DIAGNOSIS — Z00.00 MEDICARE ANNUAL WELLNESS VISIT, SUBSEQUENT: Primary | ICD-10-CM

## 2022-12-14 DIAGNOSIS — R52 GENERALIZED BODY ACHES: ICD-10-CM

## 2022-12-14 DIAGNOSIS — R68.83 CHILLS: ICD-10-CM

## 2022-12-14 DIAGNOSIS — R53.83 FATIGUE, UNSPECIFIED TYPE: ICD-10-CM

## 2022-12-14 LAB
INFLUENZA A ANTIGEN, POC: POSITIVE
INFLUENZA B ANTIGEN, POC: NEGATIVE
Lab: NORMAL
PERFORMING INSTRUMENT: NORMAL
QC PASS/FAIL: NORMAL
SARS-COV-2, POC: NORMAL

## 2022-12-14 PROCEDURE — 3078F DIAST BP <80 MM HG: CPT | Performed by: FAMILY MEDICINE

## 2022-12-14 PROCEDURE — 87426 SARSCOV CORONAVIRUS AG IA: CPT | Performed by: FAMILY MEDICINE

## 2022-12-14 PROCEDURE — 3074F SYST BP LT 130 MM HG: CPT | Performed by: FAMILY MEDICINE

## 2022-12-14 PROCEDURE — 87804 INFLUENZA ASSAY W/OPTIC: CPT | Performed by: FAMILY MEDICINE

## 2022-12-14 PROCEDURE — G0439 PPPS, SUBSEQ VISIT: HCPCS | Performed by: FAMILY MEDICINE

## 2022-12-14 PROCEDURE — 1123F ACP DISCUSS/DSCN MKR DOCD: CPT | Performed by: FAMILY MEDICINE

## 2022-12-14 RX ORDER — PREDNISONE 20 MG/1
40 TABLET ORAL DAILY
Qty: 10 TABLET | Refills: 0 | Status: SHIPPED | OUTPATIENT
Start: 2022-12-14 | End: 2022-12-19

## 2022-12-14 RX ORDER — OSELTAMIVIR PHOSPHATE 75 MG/1
75 CAPSULE ORAL 2 TIMES DAILY
Qty: 10 CAPSULE | Refills: 0 | Status: SHIPPED | OUTPATIENT
Start: 2022-12-14 | End: 2022-12-19

## 2022-12-14 RX ORDER — ALBUTEROL SULFATE 90 UG/1
2 AEROSOL, METERED RESPIRATORY (INHALATION) 4 TIMES DAILY PRN
Qty: 18 G | Refills: 0 | Status: SHIPPED | OUTPATIENT
Start: 2022-12-14

## 2022-12-14 ASSESSMENT — LIFESTYLE VARIABLES
HOW MANY STANDARD DRINKS CONTAINING ALCOHOL DO YOU HAVE ON A TYPICAL DAY: 1 OR 2
HOW OFTEN DO YOU HAVE A DRINK CONTAINING ALCOHOL: MONTHLY OR LESS

## 2022-12-14 ASSESSMENT — PATIENT HEALTH QUESTIONNAIRE - PHQ9
SUM OF ALL RESPONSES TO PHQ QUESTIONS 1-9: 0
2. FEELING DOWN, DEPRESSED OR HOPELESS: 0
SUM OF ALL RESPONSES TO PHQ QUESTIONS 1-9: 0
SUM OF ALL RESPONSES TO PHQ QUESTIONS 1-9: 0
SUM OF ALL RESPONSES TO PHQ9 QUESTIONS 1 & 2: 0
1. LITTLE INTEREST OR PLEASURE IN DOING THINGS: 0
SUM OF ALL RESPONSES TO PHQ QUESTIONS 1-9: 0

## 2022-12-14 NOTE — PATIENT INSTRUCTIONS
Fatigue: Care Instructions  Your Care Instructions     Fatigue is a feeling of tiredness, exhaustion, or lack of energy. You may feel fatigue because of too much or not enough activity. It can also come from stress, lack of sleep, boredom, and poor diet. Many medical problems, such as viral infections, can cause fatigue. Emotional problems, especially depression, are often the cause of fatigue. Fatigue is most often a symptom of another problem. Treatment for fatigue depends on the cause. For example, if you have fatigue because you have a certain health problem, treating this problem also treats your fatigue. If depression or anxiety is the cause, treatment may help. Follow-up care is a key part of your treatment and safety. Be sure to make and go to all appointments, and call your doctor if you are having problems. It's also a good idea to know your test results and keep a list of the medicines you take. How can you care for yourself at home? Get regular exercise. But don't overdo it. Go back and forth between rest and exercise. Get plenty of rest.  Eat a healthy diet. Do not skip meals, especially breakfast.  Reduce your use of caffeine, tobacco, and alcohol. Caffeine is most often found in coffee, tea, cola drinks, and chocolate. Limit medicines that can cause fatigue. This includes tranquilizers and cold and allergy medicines. When should you call for help? Watch closely for changes in your health, and be sure to contact your doctor if:    You have new symptoms such as fever or a rash.     Your fatigue gets worse.     You have been feeling down, depressed, or hopeless. Or you may have lost interest in things that you usually enjoy.     You are not getting better as expected. Where can you learn more? Go to http://www.[employer]/ and enter E962 to learn more about \"Fatigue: Care Instructions. \"  Current as of: February 9, 2022               Content Version: 13.5  © 1995-4962 Healthwise, Incorporated. Care instructions adapted under license by [facility] ([facility]). If you have questions about a medical condition or this instruction, always ask your healthcare professional. [address] any warranty or liability for your use of this information. Advance Directives: Care Instructions  Overview  An advance directive is a legal way to state your wishes at the end of your life. It tells your family and your doctor what to do if you can't say what you want. There are two main types of advance directives. You can change them any time your wishes change. Living will. This form tells your family and your doctor your wishes about life support and other treatment. The form is also called a declaration. Medical power of . This form lets you name a person to make treatment decisions for you when you can't speak for yourself. This person is called a health care agent (health care proxy, health care surrogate). The form is also called a durable power of  for health care. If you do not have an advance directive, decisions about your medical care may be made by a family member, or by a doctor or a  who doesn't know you. It may help to think of an advance directive as a gift to the people who care for you. If you have one, they won't have to make tough decisions by themselves. For more information, including forms for your state, see the [address] website (www.caringinfo.org/planning/advance-directives/). Follow-up care is a key part of your treatment and safety. Be sure to make and go to all appointments, and call your doctor if you are having problems. It's also a good idea to know your test results and keep a list of the medicines you take. What should you include in an advance directive? Many states have a unique advance directive form.  (It may ask you to address specific issues.) Or you might use a universal form that's approved by many states. If your form doesn't tell you what to address, it may be hard to know what to include in your advance directive. Use the questions below to help you get started. Who do you want to make decisions about your medical care if you are not able to? What life-support measures do you want if you have a serious illness that gets worse over time or can't be cured? What are you most afraid of that might happen? (Maybe you're afraid of having pain, losing your independence, or being kept alive by machines.)  Where would you prefer to die? (Your home? A hospital? A nursing home?)  Do you want to donate your organs when you die? Do you want certain Confucianist practices performed before you die? When should you call for help? Be sure to contact your doctor if you have any questions. Where can you learn more? Go to http://www.[employer].com/ and enter R264 to learn more about \"Advance Directives: Care Instructions. \"  Current as of: June 16, 2022               Content Version: 13.5  © 2311-5758 Healthwise, Incorporated. Care instructions adapted under license by [facility] ([facility]). If you have questions about a medical condition or this instruction, always ask your healthcare professional. [place] [address] any warranty or liability for your use of this information. Personalized Preventive Plan for Alvin Negron - 12/14/2022  Medicare offers a range of preventive health benefits. Some of the tests and screenings are paid in full while other may be subject to a deductible, co-insurance, and/or copay. Some of these benefits include a comprehensive review of your medical history including lifestyle, illnesses that may run in your family, and various assessments and screenings as appropriate. After reviewing your medical record and screening and assessments performed today your provider may have ordered immunizations, labs, imaging, and/or referrals for you.   A list of these orders (if applicable) as well as your Preventive Care list are included within your After Visit Summary for your review. Other Preventive Recommendations:    A preventive eye exam performed by an eye specialist is recommended every 1-2 years to screen for glaucoma; cataracts, macular degeneration, and other eye disorders. A preventive dental visit is recommended every 6 months. Try to get at least 150 minutes of exercise per week or 10,000 steps per day on a pedometer . Order or download the FREE \"Exercise & Physical Activity: Your Everyday Guide\" from [employer]. Call 2-600.616.4548 or search [employer] online. You need 1912-0094 mg of calcium and 9157-8721 IU of vitamin D per day. It is possible to meet your calcium requirement with diet alone, but a vitamin D supplement is usually necessary to meet this goal.  When exposed to the sun, use a sunscreen that protects against both UVA and UVB radiation with an SPF of 30 or greater. Reapply every 2 to 3 hours or after sweating, drying off with a towel, or swimming. Always wear a seat belt when traveling in a car. Always wear a helmet when riding a bicycle or motorcycle.

## 2022-12-14 NOTE — PROGRESS NOTES
Medicare Annual Wellness Visit    Dorothea Portillo is here for Medicare AWV, Sweats (& Chills (profusely at night) times 3 days), Daytime Sleepiness, and Muscle Pain    Assessment & Plan   Medicare annual wellness visit, subsequent  See below    Influenza A  -     oseltamivir (TAMIFLU) 75 MG capsule; Take 1 capsule by mouth 2 times daily for 5 days, Disp-10 capsule, R-0Normal  -     predniSONE (DELTASONE) 20 MG tablet; Take 2 tablets by mouth daily for 5 days, Disp-10 tablet, R-0Normal  -     albuterol sulfate HFA (VENTOLIN HFA) 108 (90 Base) MCG/ACT inhaler; Inhale 2 puffs into the lungs 4 times daily as needed for Wheezing, Disp-18 g, R-0Normal  + Due to diffuse wheezing, will go ahead and give steroid burst along with an inhaler to use as needed. Discussed proper use of the inhaler.  + Red flags given    Fatigue, unspecified type  -     POCT COVID-19, Antigen  -     POCT Influenza A/B Antigen (BD Veritor)  Chills  -     POCT COVID-19, Antigen  -     POCT Influenza A/B Antigen (BD Veritor)  Generalized body aches  -     POCT COVID-19, Antigen  -     POCT Influenza A/B Antigen (BD Veritor)      Recommendations for Preventive Services Due: see orders and patient instructions/AVS.  Recommended screening schedule for the next 5-10 years is provided to the patient in written form: see Patient Instructions/AVS.     Return in 4 months (on 4/14/2023) as already schedule with fasting lab prior and for Medicare Annual Wellness Visit in 1 year. Subjective     Started feeling sick 2 days ago, went home from work  Chills, sweats, aches, pains  Slept 18 hours straight  Cough, rattles in chest  Remote h/o smoking  No h/o asthma/COPD    Flu A +  COVID neg    Patient's complete Health Risk Assessment and screening values have been reviewed and are found in Flowsheets. The following problems were reviewed today and where indicated follow up appointments were made and/or referrals ordered.     Positive Risk Factor Screenings with Interventions:               General HRA Questions:  Select all that apply: (!) New or Increased Fatigue    Fatigue Interventions:  Patient comments: Influenza             Advanced Directives:  Do you have a Living Will?: (!) No    Intervention:  has NO advanced directive - information provided                       Objective   Vitals:    12/14/22 0930 12/14/22 1101   BP: (!) 140/76 110/64   Pulse: 84    Resp: 16    Temp: 98.1 °F (36.7 °C)    TempSrc: Temporal    SpO2: 97%    Weight: 188 lb (85.3 kg)    Height: 5' 11\" (1.803 m)       Body mass index is 26.22 kg/m². General: NCAT, mildly ill-appearing  Heart: RRR  Lungs: +diffuse wheezing, good airmovement      Allergies   Allergen Reactions    Pcn [Penicillins] Rash     Prior to Visit Medications    Medication Sig Taking?  Authorizing Provider   oseltamivir (TAMIFLU) 75 MG capsule Take 1 capsule by mouth 2 times daily for 5 days Yes Jamari Ventura MD   predniSONE (DELTASONE) 20 MG tablet Take 2 tablets by mouth daily for 5 days Yes Jamari Ventura MD   albuterol sulfate HFA (VENTOLIN HFA) 108 (90 Base) MCG/ACT inhaler Inhale 2 puffs into the lungs 4 times daily as needed for Wheezing Yes Jamari Ventura MD   levothyroxine (SYNTHROID) 88 MCG tablet Take 1 tablet by mouth daily Yes Jamari Ventura MD   dapagliflozin (FARXIGA) 5 MG tablet Take 1 tablet by mouth every morning Yes Jamari Ventura MD   atomoxetine (STRATTERA) 40 MG capsule TAKE 1 CAPSULE TWICE A DAY Yes Jamari Ventura MD   losartan (COZAAR) 50 MG tablet TAKE 1 TABLET DAILY Yes Jamari Ventura MD   pantoprazole (PROTONIX) 40 MG tablet TAKE 1 TABLET THREE TIMES A WEEK Yes Jamari Ventura MD   rosuvastatin (CRESTOR) 20 MG tablet TAKE 1 TABLET DAILY Yes Jamari Ventura MD   canagliflozin (INVOKANA) 300 MG TABS tablet Take 150 mg by mouth every morning (before breakfast) Yes Historical Provider, MD   Omega-3 Fatty Acids ([employer] FISH OIL) 1200 MG CAPS Take 1 capsule by mouth daily Yes Historical Provider, MD   Cyanocobalamin (VITAMIN B 12 PO) Take  by mouth daily. Yes Historical Provider, MD   Vitamin D (CHOLECALCIFEROL) 1000 UNITS CAPS capsule Take 3,000 Units by mouth daily  Yes Historical Provider, MD   Magnesium 500 MG CAPS Take  by mouth daily. Yes Historical Provider, MD   ANDROGEL PUMP 20.25 MG/ACT (1.62%) GEL gel Apply 2 actuation topically daily for 90 days.  Indications: two to three pumps daily  Carmelina Chirinos MD       [facility] (Including outside providers/suppliers regularly involved in providing care):   Patient Care Team:  Carmelina Chirinos MD as PCP - General (Family Medicine)  Carmelina Chirinos MD as PCP - [facility] Empaneled Provider  Daysi uY MD as Consulting Physician (Hematology and Oncology)  Saman Gupta MD as Consulting Physician (Urology)     Reviewed and updated this visit:  Tobacco  Allergies  Meds  Med Hx  Surg Hx  Soc Hx  Fam Hx

## 2022-12-21 ENCOUNTER — TELEPHONE (OUTPATIENT)
Dept: FAMILY MEDICINE CLINIC | Age: 77
End: 2022-12-21

## 2022-12-21 NOTE — TELEPHONE ENCOUNTER
Patient states he is about 50% better since his appointment. He continues to have wheezing and body aches/pains. He completed the steroid pack and Tamiflu & is using the inhaler prn. He will continue to monitor symptoms and let us know if he worsens or would like to be seen.

## 2022-12-21 NOTE — TELEPHONE ENCOUNTER
Looks like he was positive for flu and was given Tamiflu, prednisone and inhaler. If his symptoms are not getting better, I would recommend him to be seen in our office if appointments are available. If not, advised him to go to urgent care.

## 2022-12-21 NOTE — TELEPHONE ENCOUNTER
----- Message from Alan Rosenthal sent at 12/21/2022  9:18 AM EST -----  Subject: Message to Provider    QUESTIONS  Information for Provider? PT has been taking the medications for his cold   symptoms and still not feeling any better, he was told to call back if   symptoms continues, please advise on what the next steps are for him. please call on home phone.   ---------------------------------------------------------------------------  --------------  [id number] [place]  [phone]; OK to leave message on voicemail  ---------------------------------------------------------------------------  --------------  SCRIPT ANSWERS  Relationship to Patient?  Self

## 2023-03-01 ENCOUNTER — TELEPHONE (OUTPATIENT)
Dept: FAMILY MEDICINE CLINIC | Age: 78
End: 2023-03-01

## 2023-03-01 ENCOUNTER — OFFICE VISIT (OUTPATIENT)
Dept: FAMILY MEDICINE CLINIC | Age: 78
End: 2023-03-01
Payer: MEDICARE

## 2023-03-01 VITALS
OXYGEN SATURATION: 97 % | TEMPERATURE: 98.2 F | SYSTOLIC BLOOD PRESSURE: 120 MMHG | WEIGHT: 192 LBS | HEART RATE: 81 BPM | DIASTOLIC BLOOD PRESSURE: 72 MMHG | BODY MASS INDEX: 26.78 KG/M2

## 2023-03-01 DIAGNOSIS — M79.671 RIGHT FOOT PAIN: ICD-10-CM

## 2023-03-01 DIAGNOSIS — S92.324A CLOSED NONDISPLACED FRACTURE OF SECOND METATARSAL BONE OF RIGHT FOOT, INITIAL ENCOUNTER: Primary | ICD-10-CM

## 2023-03-01 PROCEDURE — 99204 OFFICE O/P NEW MOD 45 MIN: CPT | Performed by: PHYSICIAN ASSISTANT

## 2023-03-01 PROCEDURE — 3078F DIAST BP <80 MM HG: CPT | Performed by: PHYSICIAN ASSISTANT

## 2023-03-01 PROCEDURE — 1123F ACP DISCUSS/DSCN MKR DOCD: CPT | Performed by: PHYSICIAN ASSISTANT

## 2023-03-01 PROCEDURE — 3074F SYST BP LT 130 MM HG: CPT | Performed by: PHYSICIAN ASSISTANT

## 2023-03-01 NOTE — TELEPHONE ENCOUNTER
Patient was transferred in via nurse for chief complaint of right foot pain, started Sunday and has pink painful area in his arch area. Skin is tight and itchy. Has been using cold compress for the pain. We did not have any appointments available advised him to go a walk in to be seen. Patient agreed.

## 2023-03-01 NOTE — PROGRESS NOTES
3/1/23  Alvin Jett : 1945 Sex: male  Age 78 y.o. Subjective:  Chief Complaint   Patient presents with    Foot Pain     Pain and swelling in right foot, started          HPI:   Alvin Jett , 78 y.o. male presents to express care for evaluation of foot pain, swelling     HPI  79-year-old male presents to express care for evaluation of a right foot pain and swelling. The patient states that he had gout remotely but has not had any issues with gout since. The patient has been eating quite a bit of sweets recently. The patient is not having any fever, chills. The patient notes quite a bit of pain over the dorsum of the foot mostly over the fourth and fifth MTP joints but there is some swelling diffusely throughout the dorsum of the foot. The patient has noted some slight warmth and it does itch minimally. The patient has no calf pain or calf swelling. The patient is not having any chest pain, shortness of breath. ROS:   Unless otherwise stated in this report the patient's positive and negative responses for review of systems for constitutional, eyes, ENT, cardiovascular, respiratory, gastrointestinal, neurological, , musculoskeletal, and integument systems and related systems to the presenting problem are either stated in the history of present illness or were not pertinent or were negative for the symptoms and/or complaints related to the presenting medical problem. Positives and pertinent negatives as per HPI. All others reviewed and are negative.       PMH:     Past Medical History:   Diagnosis Date    Family history of renal cell carcinoma     Hypogonadism in male     Hypothyroidism     Vitamin D deficiency        Past Surgical History:   Procedure Laterality Date    CARPAL TUNNEL RELEASE Bilateral 2009    CATARACT REMOVAL WITH IMPLANT Right     CATARACT REMOVAL WITH IMPLANT Left 2021    COLONOSCOPY  2016    Dr. Obi Maldonado       Family History   Problem Relation Age of Onset    Cancer Brother         Renal cell cancer       Medications:     Current Outpatient Medications:     albuterol sulfate HFA (VENTOLIN HFA) 108 (90 Base) MCG/ACT inhaler, Inhale 2 puffs into the lungs 4 times daily as needed for Wheezing, Disp: 18 g, Rfl: 0    levothyroxine (SYNTHROID) 88 MCG tablet, Take 1 tablet by mouth daily, Disp: 90 tablet, Rfl: 3    dapagliflozin (FARXIGA) 5 MG tablet, Take 1 tablet by mouth every morning, Disp: 90 tablet, Rfl: 3    atomoxetine (STRATTERA) 40 MG capsule, TAKE 1 CAPSULE TWICE A DAY, Disp: 180 capsule, Rfl: 3    losartan (COZAAR) 50 MG tablet, TAKE 1 TABLET DAILY, Disp: 90 tablet, Rfl: 3    pantoprazole (PROTONIX) 40 MG tablet, TAKE 1 TABLET THREE TIMES A WEEK, Disp: 36 tablet, Rfl: 3    rosuvastatin (CRESTOR) 20 MG tablet, TAKE 1 TABLET DAILY, Disp: 90 tablet, Rfl: 3    canagliflozin (INVOKANA) 300 MG TABS tablet, Take 150 mg by mouth every morning (before breakfast), Disp: , Rfl:     Omega-3 Fatty Acids ([employer] FISH OIL) 1200 MG CAPS, Take 1 capsule by mouth daily, Disp: , Rfl:     ANDROGEL PUMP 20.25 MG/ACT (1.62%) GEL gel, Apply 2 actuation topically daily for 90 days. Indications: two to three pumps daily, Disp: 4 Bottle, Rfl: 0    Cyanocobalamin (VITAMIN B 12 PO), Take  by mouth daily. , Disp: , Rfl:     Vitamin D (CHOLECALCIFEROL) 1000 UNITS CAPS capsule, Take 3,000 Units by mouth daily , Disp: , Rfl:     Magnesium 500 MG CAPS, Take  by mouth daily. , Disp: , Rfl:     Allergies: Allergies   Allergen Reactions    Pcn [Penicillins] Rash       Social History:     Social History     Tobacco Use    Smoking status: Never    Smokeless tobacco: Never   Substance Use Topics    Alcohol use: Yes     Alcohol/week: 1.0 standard drink     Types: 1 Shots of liquor per week     Comment: rarely    Drug use: No       Patient lives at home.     Physical Exam:     Vitals:    03/01/23 1015   BP: 120/72   Site: Right Upper Arm   Position: Sitting   Pulse: 81   Temp: 98.2 °F (36.8 °C) TempSrc: Temporal   SpO2: 97%   Weight: 192 lb (87.1 kg)       Exam:  Physical Exam  Vital signs reviewed and nurse's notes. The patient is not hypoxic. General: Alert, no acute distress, patient resting comfortably   Skin: warm, intact, no pallor noted   Head: Normocephalic, atraumatic   Eye: Normal conjunctiva   Respiratory: No acute distress   Musculoskeletal: There is no calf tenderness. The patient has no calf swelling. There is swelling over the dorsum of the foot with specific tenderness over the fourth and fifth distal metatarsal area. The patient has some slight erythema noted to the right little toe. The patient had no palpable cord. There was no evidence of lymphangitic streaking. Pulses intact at DP/PT 2+. Neurological: alert and orient x4, normal sensory and motor observed. Psychiatric: Cooperative        Testing:           Medical Decision Making:     Vital signs reviewed    Past medical history reviewed. Allergies reviewed. Medications reviewed. Patient on arrival does not appear to be in any apparent distress or discomfort. The patient has been seen and evaluated. The patient does not appear to be toxic or lethargic. The patient was sent for an x-ray of the right foot    The patient does have evidence of a transverse distal second metatarsal fracture. The patient was placed in a cam walker. Given crutches. The patient was referred to orthopedics. The patient is neurovascularly intact. The patient is to return to express care or go directly to the emergency department should any of the signs or symptoms worsen. The patient is to followup with primary care physician in 2-3 days for repeat evaluation. The patient has no other questions or concerns at this time the patient will be discharged home. Clinical Impression:   Viktoria Pallas was seen today for foot pain.     Diagnoses and all orders for this visit:    Closed nondisplaced fracture of second metatarsal bone of right foot, initial encounter  -     [id number] Kirk DPM, Podiatry, [facility]    Right foot pain  -     XR FOOT RIGHT (MIN 3 VIEWS); Future      The patient is to call for any concerns or return if any of the signs or symptoms worsen. The patient is to follow-up with PCP in the next 2-3 days for repeat evaluation repeat assessment or go directly to the emergency department.      SIGNATURE: Gaviota Liz III, PA-C

## 2023-03-07 ENCOUNTER — TELEPHONE (OUTPATIENT)
Dept: PRIMARY CARE CLINIC | Age: 78
End: 2023-03-07

## 2023-03-07 DIAGNOSIS — S92.909A CLOSED FRACTURE OF FOOT, UNSPECIFIED LATERALITY, INITIAL ENCOUNTER: Primary | ICD-10-CM

## 2023-03-07 NOTE — TELEPHONE ENCOUNTER
Patient seen through express for broken foot. Asking if you would be able to write for a handicap placard or does this need to come from PCP. Subs at the [employer].  Was told that he would be in boot for 6-8 weeks

## 2023-03-07 NOTE — TELEPHONE ENCOUNTER
What ever he would like he can follow-up with his PCP or we can do it if you would like us to do can you please set this up through epic thank you

## 2023-03-23 ENCOUNTER — OFFICE VISIT (OUTPATIENT)
Dept: PODIATRY | Age: 78
End: 2023-03-23
Payer: MEDICARE

## 2023-03-23 VITALS
WEIGHT: 192 LBS | DIASTOLIC BLOOD PRESSURE: 64 MMHG | SYSTOLIC BLOOD PRESSURE: 130 MMHG | TEMPERATURE: 97.6 F | BODY MASS INDEX: 26.78 KG/M2

## 2023-03-23 DIAGNOSIS — S92.901A CLOSED FRACTURE OF RIGHT FOOT, INITIAL ENCOUNTER: ICD-10-CM

## 2023-03-23 DIAGNOSIS — S92.321A CLOSED DISPLACED FRACTURE OF SECOND METATARSAL BONE OF RIGHT FOOT, INITIAL ENCOUNTER: Primary | ICD-10-CM

## 2023-03-23 PROCEDURE — 3075F SYST BP GE 130 - 139MM HG: CPT | Performed by: PODIATRIST

## 2023-03-23 PROCEDURE — 99203 OFFICE O/P NEW LOW 30 MIN: CPT | Performed by: PODIATRIST

## 2023-03-23 PROCEDURE — 1123F ACP DISCUSS/DSCN MKR DOCD: CPT | Performed by: PODIATRIST

## 2023-03-23 PROCEDURE — 3078F DIAST BP <80 MM HG: CPT | Performed by: PODIATRIST

## 2023-03-23 NOTE — PROGRESS NOTES
consisted of verbal and written instructions given to patient. Contact office with any questions/problems/concerns.   RTC in 3week(s).    3/23/2023    Electronically signed by Roseanne Ochoa DPM on 3/23/2023 at 9:40 AM  3/23/2023

## 2023-04-25 ENCOUNTER — OFFICE VISIT (OUTPATIENT)
Dept: FAMILY MEDICINE CLINIC | Age: 78
End: 2023-04-25

## 2023-04-25 VITALS
HEART RATE: 95 BPM | HEIGHT: 71 IN | WEIGHT: 194 LBS | OXYGEN SATURATION: 96 % | DIASTOLIC BLOOD PRESSURE: 66 MMHG | TEMPERATURE: 98.3 F | SYSTOLIC BLOOD PRESSURE: 120 MMHG | RESPIRATION RATE: 14 BRPM | BODY MASS INDEX: 27.16 KG/M2

## 2023-04-25 DIAGNOSIS — E78.2 MIXED HYPERLIPIDEMIA: Primary | ICD-10-CM

## 2023-04-25 DIAGNOSIS — J30.1 SEASONAL ALLERGIC RHINITIS DUE TO POLLEN: ICD-10-CM

## 2023-04-25 DIAGNOSIS — E29.1 HYPOGONADISM IN MALE: ICD-10-CM

## 2023-04-25 DIAGNOSIS — S92.324D CLOSED NONDISPLACED FRACTURE OF SECOND METATARSAL BONE OF RIGHT FOOT WITH ROUTINE HEALING, SUBSEQUENT ENCOUNTER: ICD-10-CM

## 2023-04-25 DIAGNOSIS — Z12.5 ENCOUNTER FOR SCREENING FOR MALIGNANT NEOPLASM OF PROSTATE: ICD-10-CM

## 2023-04-25 DIAGNOSIS — I10 ESSENTIAL HYPERTENSION: ICD-10-CM

## 2023-04-25 DIAGNOSIS — D75.1 POLYCYTHEMIA: ICD-10-CM

## 2023-04-25 DIAGNOSIS — R73.03 PREDIABETES: ICD-10-CM

## 2023-04-25 DIAGNOSIS — F90.0 ATTENTION DEFICIT HYPERACTIVITY DISORDER (ADHD), PREDOMINANTLY INATTENTIVE TYPE: ICD-10-CM

## 2023-04-25 DIAGNOSIS — E03.9 ACQUIRED HYPOTHYROIDISM: ICD-10-CM

## 2023-04-25 RX ORDER — FLUTICASONE PROPIONATE 50 MCG
2 SPRAY, SUSPENSION (ML) NASAL DAILY
Qty: 16 G | Refills: 5 | Status: SHIPPED | OUTPATIENT
Start: 2023-04-25

## 2023-04-25 RX ORDER — ROSUVASTATIN CALCIUM 40 MG/1
40 TABLET, COATED ORAL DAILY
Qty: 90 TABLET | Refills: 3 | Status: SHIPPED | OUTPATIENT
Start: 2023-04-25

## 2023-04-25 ASSESSMENT — PATIENT HEALTH QUESTIONNAIRE - PHQ9
SUM OF ALL RESPONSES TO PHQ9 QUESTIONS 1 & 2: 0
SUM OF ALL RESPONSES TO PHQ QUESTIONS 1-9: 0
SUM OF ALL RESPONSES TO PHQ QUESTIONS 1-9: 0
1. LITTLE INTEREST OR PLEASURE IN DOING THINGS: 0
SUM OF ALL RESPONSES TO PHQ QUESTIONS 1-9: 0
2. FEELING DOWN, DEPRESSED OR HOPELESS: 0
SUM OF ALL RESPONSES TO PHQ QUESTIONS 1-9: 0

## 2023-04-25 NOTE — PROGRESS NOTES
Subjective:  78 y.o. y/o male is here for follow up chronic hypertension. This is generally controlled on current medication regimen. Takes meds as directed and tolerates them well. Most recent labs reviewed with patient , overall stable. No symptoms from htn standpoint per ROS. Patient is compliant with lifestyle modifications. Patient does not smoke. Comorbid conditions include below.       Being treated for right 2nd metatarsal fracture, wearing CAM boot for past 6 weeks, +podiatry, Dr. Sharon Tomlinson, f/u appt tomorrow, injured over 6 months ago  +decreased activity and worsened diet for past 6 weeks    Urology: Dr. Tiki Domingo, hypogonadism, Androgel, +occ phlebotomies, 3/22 OV, yearly appt, brother  of RCC; PSA 0.6, total test [phone] (10/22)  Hematology: Dr. 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, +h/o multiple phlebotomies, monitoring small lung nodules, reviewed  OV, HCT 50.9, f/u 6 months  ADD: Strattera for years, helps with concentration, still working as   Prediabetes/?DM2: Farxiga 5mg now, DM eye exam  OK, Hgb A1C 6.2 (stable)  Hypothyroidism: TSH 1.79, Synthroid 88 mcg daily, no symptoms  Hyperlipidemia: LDL 86, taking rosuvastatin 20mg daily, tolerating  The 10-year ASCVD risk score (Henry DK, et al., 2019) is: 24.6%*    Values used to calculate the score:      Age: 78 years      Sex: Male      Is Non- : No      Diabetic: No      Tobacco smoker: No      Systolic Blood Pressure: 537 mmHg      Is BP treated: Yes      HDL Cholesterol: 44 mg/dL      Total Cholesterol: 152 mg/dL*      * - Cholesterol units were assumed for this score calculation  Vit D def: Taking 3000 IU daily  Colonoscopy, 2016, 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

## 2023-04-26 ENCOUNTER — OFFICE VISIT (OUTPATIENT)
Dept: PODIATRY | Age: 78
End: 2023-04-26
Payer: MEDICARE

## 2023-04-26 VITALS
DIASTOLIC BLOOD PRESSURE: 72 MMHG | WEIGHT: 194 LBS | SYSTOLIC BLOOD PRESSURE: 128 MMHG | TEMPERATURE: 97.2 F | BODY MASS INDEX: 27.06 KG/M2

## 2023-04-26 DIAGNOSIS — S92.901D CLOSED FRACTURE OF RIGHT FOOT WITH ROUTINE HEALING, SUBSEQUENT ENCOUNTER: ICD-10-CM

## 2023-04-26 DIAGNOSIS — S92.321D CLOSED DISPLACED FRACTURE OF SECOND METATARSAL BONE OF RIGHT FOOT WITH ROUTINE HEALING, SUBSEQUENT ENCOUNTER: Primary | ICD-10-CM

## 2023-04-26 PROCEDURE — 1123F ACP DISCUSS/DSCN MKR DOCD: CPT | Performed by: PODIATRIST

## 2023-04-26 PROCEDURE — 3074F SYST BP LT 130 MM HG: CPT | Performed by: PODIATRIST

## 2023-04-26 PROCEDURE — 3078F DIAST BP <80 MM HG: CPT | Performed by: PODIATRIST

## 2023-04-26 PROCEDURE — 99213 OFFICE O/P EST LOW 20 MIN: CPT | Performed by: PODIATRIST

## 2023-04-26 NOTE — PROGRESS NOTES
Surgical History:   Procedure Laterality Date    CARPAL TUNNEL RELEASE Bilateral 2009    CATARACT REMOVAL WITH IMPLANT Right 2016    CATARACT REMOVAL WITH IMPLANT Left 02/2021    COLONOSCOPY  2016    Dr. Bishop Goyal     Family History   Problem Relation Age of Onset    Cancer Brother         Renal cell cancer     Social History     Socioeconomic History    Marital status: Single     Spouse name: N/A    Number of children: 0    Years of education: 18    Highest education level: Master's degree (e.g., MA, MS, [employer], MEd, MSW, REFUGIO)   Occupational History    Not on file   Tobacco Use    Smoking status: Never    Smokeless tobacco: Never   Substance and Sexual Activity    Alcohol use:  Yes     Alcohol/week: 1.0 standard drink     Types: 1 Shots of liquor per week     Comment: rarely    Drug use: No    Sexual activity: Not on file   Other Topics Concern    Not on file   Social History Narrative    Not on file     Social Determinants of Health     Financial Resource Strain: Low Risk     Difficulty of Paying Living Expenses: Not hard at all   Food Insecurity: No Food Insecurity    Worried About Running Out of Food in the Last Year: Never true    Ran Out of Food in the Last Year: Never true   Transportation Needs: Not on file   Physical Activity: Insufficiently Active    Days of Exercise per Week: 2 days    Minutes of Exercise per Session: 20 min   Stress: Not on file   Social Connections: Not on file   Intimate Partner Violence: Not on file   Housing Stability: Not on file       Vitals:    04/26/23 1308   BP: 128/72   Temp: 97.2 °F (36.2 °C)   TempSrc: Temporal   Weight: 194 lb (88 kg)       Focused Lower Extremity Physical Exam:  Vitals:    04/26/23 1308   BP: 128/72   Temp: 97.2 °F (36.2 °C)        Foot Exam    General  General Appearance: appears stated age and healthy   Orientation: alert and oriented to person, place, and time       Right Foot/Ankle     Inspection and Palpation  Ecchymosis: none  Tenderness: none   Swelling:

## 2023-06-07 ENCOUNTER — OFFICE VISIT (OUTPATIENT)
Dept: PODIATRY | Age: 78
End: 2023-06-07
Payer: MEDICARE

## 2023-06-07 VITALS
WEIGHT: 194 LBS | BODY MASS INDEX: 27.06 KG/M2 | DIASTOLIC BLOOD PRESSURE: 62 MMHG | TEMPERATURE: 97.9 F | SYSTOLIC BLOOD PRESSURE: 123 MMHG

## 2023-06-07 DIAGNOSIS — S92.901D CLOSED FRACTURE OF RIGHT FOOT WITH ROUTINE HEALING, SUBSEQUENT ENCOUNTER: Primary | ICD-10-CM

## 2023-06-07 PROCEDURE — 99213 OFFICE O/P EST LOW 20 MIN: CPT | Performed by: PODIATRIST

## 2023-06-07 PROCEDURE — 3074F SYST BP LT 130 MM HG: CPT | Performed by: PODIATRIST

## 2023-06-07 PROCEDURE — 3078F DIAST BP <80 MM HG: CPT | Performed by: PODIATRIST

## 2023-06-07 PROCEDURE — 1123F ACP DISCUSS/DSCN MKR DOCD: CPT | Performed by: PODIATRIST

## 2023-07-09 DIAGNOSIS — K21.00 REFLUX ESOPHAGITIS: ICD-10-CM

## 2023-07-10 RX ORDER — PANTOPRAZOLE SODIUM 40 MG/1
TABLET, DELAYED RELEASE ORAL
Qty: 36 TABLET | Refills: 3 | Status: SHIPPED | OUTPATIENT
Start: 2023-07-10

## 2023-09-21 LAB — DIABETIC RETINOPATHY: NEGATIVE

## 2023-10-16 LAB
ALBUMIN SERPL-MCNC: NORMAL G/DL
ALP BLD-CCNC: NORMAL U/L
ALT SERPL-CCNC: NORMAL U/L
ANION GAP SERPL CALCULATED.3IONS-SCNC: NORMAL MMOL/L
AST SERPL-CCNC: NORMAL U/L
AVERAGE GLUCOSE: NORMAL
BASOPHILS ABSOLUTE: NORMAL
BASOPHILS RELATIVE PERCENT: NORMAL
BILIRUB SERPL-MCNC: NORMAL MG/DL
BUN BLDV-MCNC: NORMAL MG/DL
CALCIUM SERPL-MCNC: NORMAL MG/DL
CHLORIDE BLD-SCNC: NORMAL MMOL/L
CHOLESTEROL, FASTING: 118
CO2: NORMAL
CREAT SERPL-MCNC: NORMAL MG/DL
EGFR: NORMAL
EOSINOPHILS ABSOLUTE: NORMAL
EOSINOPHILS RELATIVE PERCENT: NORMAL
GLUCOSE BLD-MCNC: NORMAL MG/DL
HBA1C MFR BLD: 6.2 %
HCT VFR BLD CALC: NORMAL %
HDLC SERPL-MCNC: 47 MG/DL (ref 35–70)
HEMOGLOBIN: NORMAL
LDL CHOLESTEROL CALCULATED: 55 MG/DL (ref 0–160)
LYMPHOCYTES ABSOLUTE: NORMAL
LYMPHOCYTES RELATIVE PERCENT: NORMAL
MCH RBC QN AUTO: NORMAL PG
MCHC RBC AUTO-ENTMCNC: NORMAL G/DL
MCV RBC AUTO: NORMAL FL
MONOCYTES ABSOLUTE: NORMAL
MONOCYTES RELATIVE PERCENT: NORMAL
NEUTROPHILS ABSOLUTE: NORMAL
NEUTROPHILS RELATIVE PERCENT: NORMAL
PDW BLD-RTO: NORMAL %
PLATELET # BLD: NORMAL 10*3/UL
PMV BLD AUTO: NORMAL FL
POTASSIUM SERPL-SCNC: NORMAL MMOL/L
PROSTATE SPECIFIC ANTIGEN: 0.58 NG/ML
RBC # BLD: NORMAL 10*6/UL
SODIUM BLD-SCNC: NORMAL MMOL/L
TESTOSTERONE TOTAL: 541
TOTAL PROTEIN: NORMAL
TRIGLYCERIDE, FASTING: 76
TSH SERPL DL<=0.05 MIU/L-ACNC: 1.47 UIU/ML
WBC # BLD: NORMAL 10*3/UL

## 2023-10-17 DIAGNOSIS — E78.2 MIXED HYPERLIPIDEMIA: ICD-10-CM

## 2023-10-17 DIAGNOSIS — R73.03 PREDIABETES: ICD-10-CM

## 2023-10-17 DIAGNOSIS — E03.9 ACQUIRED HYPOTHYROIDISM: ICD-10-CM

## 2023-10-17 DIAGNOSIS — Z12.5 ENCOUNTER FOR SCREENING FOR MALIGNANT NEOPLASM OF PROSTATE: ICD-10-CM

## 2023-10-17 DIAGNOSIS — E29.1 HYPOGONADISM IN MALE: ICD-10-CM

## 2023-10-17 DIAGNOSIS — I10 ESSENTIAL HYPERTENSION: ICD-10-CM

## 2023-11-09 NOTE — PROGRESS NOTES
pain with palpation to the second metatarsal right foot full range of motion with no crepitus. Noted new x-rays were taken showing a healed second metatarsal fracture        Assessment and Plan: Patient may resume all activities Hoka running shoe. Reappoint as needed  Juan oSrensen was seen today for foot injury. Diagnoses and all orders for this visit:    Closed fracture of right foot with routine healing, subsequent encounter  -     XR FOOT RIGHT (MIN 3 VIEWS); Future        No follow-ups on file. Seen By:  Jaja Sainz DPM      Document was created using voice recognition software. Note was reviewed, however may contain grammatical errors.
yes

## 2023-11-20 DIAGNOSIS — R73.03 PREDIABETES: ICD-10-CM

## 2023-11-20 RX ORDER — DAPAGLIFLOZIN 5 MG/1
5 TABLET, FILM COATED ORAL EVERY MORNING
Qty: 90 TABLET | Refills: 0 | Status: SHIPPED | OUTPATIENT
Start: 2023-11-20

## 2023-12-01 DIAGNOSIS — I10 ESSENTIAL HYPERTENSION: ICD-10-CM

## 2023-12-01 RX ORDER — LOSARTAN POTASSIUM 50 MG/1
TABLET ORAL
Qty: 90 TABLET | Refills: 0 | Status: SHIPPED | OUTPATIENT
Start: 2023-12-01

## 2023-12-01 RX ORDER — LEVOTHYROXINE SODIUM 88 UG/1
88 TABLET ORAL DAILY
Qty: 90 TABLET | Refills: 0 | Status: SHIPPED | OUTPATIENT
Start: 2023-12-01

## 2023-12-12 DIAGNOSIS — F90.0 ATTENTION DEFICIT HYPERACTIVITY DISORDER (ADHD), PREDOMINANTLY INATTENTIVE TYPE: ICD-10-CM

## 2023-12-13 RX ORDER — ATOMOXETINE 40 MG/1
CAPSULE ORAL
Qty: 180 CAPSULE | Refills: 0 | Status: SHIPPED | OUTPATIENT
Start: 2023-12-13

## 2024-01-23 SDOH — ECONOMIC STABILITY: INCOME INSECURITY: HOW HARD IS IT FOR YOU TO PAY FOR THE VERY BASICS LIKE FOOD, HOUSING, MEDICAL CARE, AND HEATING?: NOT HARD AT ALL

## 2024-01-23 SDOH — ECONOMIC STABILITY: FOOD INSECURITY: WITHIN THE PAST 12 MONTHS, YOU WORRIED THAT YOUR FOOD WOULD RUN OUT BEFORE YOU GOT MONEY TO BUY MORE.: NEVER TRUE

## 2024-01-23 SDOH — ECONOMIC STABILITY: HOUSING INSECURITY
IN THE LAST 12 MONTHS, WAS THERE A TIME WHEN YOU DID NOT HAVE A STEADY PLACE TO SLEEP OR SLEPT IN A SHELTER (INCLUDING NOW)?: NO

## 2024-01-23 SDOH — ECONOMIC STABILITY: FOOD INSECURITY: WITHIN THE PAST 12 MONTHS, THE FOOD YOU BOUGHT JUST DIDN'T LAST AND YOU DIDN'T HAVE MONEY TO GET MORE.: NEVER TRUE

## 2024-01-25 ENCOUNTER — OFFICE VISIT (OUTPATIENT)
Dept: FAMILY MEDICINE CLINIC | Age: 79
End: 2024-01-25

## 2024-01-25 VITALS
HEART RATE: 86 BPM | DIASTOLIC BLOOD PRESSURE: 60 MMHG | HEIGHT: 71 IN | TEMPERATURE: 98.4 F | RESPIRATION RATE: 16 BRPM | BODY MASS INDEX: 26.74 KG/M2 | OXYGEN SATURATION: 98 % | WEIGHT: 191 LBS | SYSTOLIC BLOOD PRESSURE: 132 MMHG

## 2024-01-25 DIAGNOSIS — M25.561 CHRONIC PAIN OF RIGHT KNEE: ICD-10-CM

## 2024-01-25 DIAGNOSIS — E78.2 MIXED HYPERLIPIDEMIA: ICD-10-CM

## 2024-01-25 DIAGNOSIS — F90.0 ATTENTION DEFICIT HYPERACTIVITY DISORDER (ADHD), PREDOMINANTLY INATTENTIVE TYPE: ICD-10-CM

## 2024-01-25 DIAGNOSIS — D75.1 POLYCYTHEMIA: ICD-10-CM

## 2024-01-25 DIAGNOSIS — I10 ESSENTIAL HYPERTENSION: Primary | ICD-10-CM

## 2024-01-25 DIAGNOSIS — G89.29 CHRONIC PAIN OF RIGHT KNEE: ICD-10-CM

## 2024-01-25 DIAGNOSIS — R73.03 PREDIABETES: ICD-10-CM

## 2024-01-25 DIAGNOSIS — K21.00 GASTROESOPHAGEAL REFLUX DISEASE WITH ESOPHAGITIS WITHOUT HEMORRHAGE: ICD-10-CM

## 2024-01-25 DIAGNOSIS — E03.9 ACQUIRED HYPOTHYROIDISM: ICD-10-CM

## 2024-01-25 DIAGNOSIS — Z91.81 AT HIGH RISK FOR FALLS: ICD-10-CM

## 2024-01-25 ASSESSMENT — ENCOUNTER SYMPTOMS
CHEST TIGHTNESS: 0
SHORTNESS OF BREATH: 0

## 2024-01-25 ASSESSMENT — PATIENT HEALTH QUESTIONNAIRE - PHQ9
1. LITTLE INTEREST OR PLEASURE IN DOING THINGS: 0
2. FEELING DOWN, DEPRESSED OR HOPELESS: 0
SUM OF ALL RESPONSES TO PHQ QUESTIONS 1-9: 0
SUM OF ALL RESPONSES TO PHQ9 QUESTIONS 1 & 2: 0

## 2024-03-04 DIAGNOSIS — I10 ESSENTIAL HYPERTENSION: ICD-10-CM

## 2024-03-04 DIAGNOSIS — K21.00 REFLUX ESOPHAGITIS: ICD-10-CM

## 2024-03-04 DIAGNOSIS — E78.2 MIXED HYPERLIPIDEMIA: ICD-10-CM

## 2024-03-04 DIAGNOSIS — F90.0 ATTENTION DEFICIT HYPERACTIVITY DISORDER (ADHD), PREDOMINANTLY INATTENTIVE TYPE: ICD-10-CM

## 2024-03-04 NOTE — TELEPHONE ENCOUNTER
Last Appointment   1/25/2024  Next Appointment  8/2/2024  Patient called in regarding medications, he switched to [facility], they should be contacting us with refills, Wanted us to have a heads up. He is nervous he will run out of meds.       Also wanted to know if we could send in the generic for Farxiga as it is expensive for the name brand.

## 2024-03-04 NOTE — TELEPHONE ENCOUNTER
Fax received from [facility] - all meds pending   Including generic Farxiga (which is still not covered according to Epic)

## 2024-03-05 RX ORDER — LOSARTAN POTASSIUM 50 MG/1
50 TABLET ORAL DAILY
Qty: 90 TABLET | Refills: 3 | Status: SHIPPED | OUTPATIENT
Start: 2024-03-05

## 2024-03-05 RX ORDER — ATOMOXETINE 40 MG/1
40 CAPSULE ORAL 2 TIMES DAILY
Qty: 180 CAPSULE | Refills: 3 | Status: SHIPPED | OUTPATIENT
Start: 2024-03-05

## 2024-03-05 RX ORDER — DAPAGLIFLOZIN 5 MG/1
5 TABLET, FILM COATED ORAL EVERY MORNING
Qty: 90 TABLET | Refills: 3 | Status: SHIPPED | OUTPATIENT
Start: 2024-03-05

## 2024-03-05 RX ORDER — LEVOTHYROXINE SODIUM 88 UG/1
88 TABLET ORAL DAILY
Qty: 90 TABLET | Refills: 3 | Status: SHIPPED | OUTPATIENT
Start: 2024-03-05

## 2024-03-05 RX ORDER — ROSUVASTATIN CALCIUM 40 MG/1
40 TABLET, COATED ORAL DAILY
Qty: 90 TABLET | Refills: 3 | Status: SHIPPED | OUTPATIENT
Start: 2024-03-05

## 2024-03-05 RX ORDER — PANTOPRAZOLE SODIUM 40 MG/1
TABLET, DELAYED RELEASE ORAL
Qty: 36 TABLET | Refills: 3 | Status: SHIPPED | OUTPATIENT
Start: 2024-03-05

## 2024-03-28 NOTE — TELEPHONE ENCOUNTER
Patient called regarding recent script for generic Farxiga. This is not on their list of formulary.     The name brand Farxiga is on his formulary, so he would like to go back to this.     It is on their preferred list 5mg or 10 mg - with a quantity limitation of 30 per 30 days (90 per 90 days etc)     Pended new script for patient.

## 2024-03-29 RX ORDER — DAPAGLIFLOZIN 5 MG/1
5 TABLET, FILM COATED ORAL EVERY MORNING
Qty: 90 TABLET | Refills: 1 | Status: SHIPPED | OUTPATIENT
Start: 2024-03-29

## 2024-05-07 DIAGNOSIS — J10.1 INFLUENZA A: ICD-10-CM

## 2024-05-07 RX ORDER — ALBUTEROL SULFATE 90 UG/1
2 AEROSOL, METERED RESPIRATORY (INHALATION) 4 TIMES DAILY PRN
Qty: 18 G | Refills: 0 | Status: SHIPPED | OUTPATIENT
Start: 2024-05-07

## 2024-05-07 NOTE — TELEPHONE ENCOUNTER
----- Message from Cheryl Pan sent at 5/7/2024  9:22 AM EDT -----  Subject: Refill Request    QUESTIONS  Name of Medication? Other - albuterol sulfate HFA (VENTOLIN HFA) 108 (90   Base) MCG/ACT inhaler  Patient-reported dosage and instructions? Inhale 2 puffs into the lungs 4   times daily as needed for Wheezing  How many days do you have left? 0  Preferred Pharmacy? [facility]/PHARMACY #[id number]  Pharmacy phone number (if available)? [phone]  Additional Information for Provider? Patient is suffering from allergies   and is having wheezing. He was last prescribed this by Dr. Mares but   hasn't had a flare up until now. Can he get a new script sent to the [facility]   retail pharmacy on [address]?  ---------------------------------------------------------------------------  --------------  CALL BACK INFO  What is the best way for the office to contact you? OK to leave message on   voicemail  Preferred Call Back Phone Number? [phone]  ---------------------------------------------------------------------------  --------------  SCRIPT ANSWERS  Relationship to Patient? Self

## 2024-05-16 DIAGNOSIS — R73.03 PREDIABETES: ICD-10-CM

## 2024-05-16 NOTE — TELEPHONE ENCOUNTER
Patient calling to follow up on Farxiga prescription that was to be sent to [facility]. He states he is due for a refill but the copay is higher for the generic form of this medication, therefore we were to send it TRACY for his next order. I apologized & explained that it appears the last prescription dated 3/29/2024 was sent to the local pharmacy in error. Advised patient I will correct this and we will resend the prescription as requested ASAP

## 2024-05-17 RX ORDER — DAPAGLIFLOZIN 5 MG/1
5 TABLET, FILM COATED ORAL EVERY MORNING
Qty: 90 TABLET | Refills: 2 | Status: SHIPPED | OUTPATIENT
Start: 2024-05-17

## 2024-05-21 ENCOUNTER — TELEPHONE (OUTPATIENT)
Dept: FAMILY MEDICINE CLINIC | Age: 79
End: 2024-05-21

## 2024-05-21 ENCOUNTER — OFFICE VISIT (OUTPATIENT)
Dept: FAMILY MEDICINE CLINIC | Age: 79
End: 2024-05-21

## 2024-05-21 VITALS
WEIGHT: 192 LBS | TEMPERATURE: 98.5 F | BODY MASS INDEX: 26.79 KG/M2 | DIASTOLIC BLOOD PRESSURE: 68 MMHG | SYSTOLIC BLOOD PRESSURE: 142 MMHG | HEART RATE: 91 BPM | OXYGEN SATURATION: 97 %

## 2024-05-21 DIAGNOSIS — R00.2 PALPITATIONS: Primary | ICD-10-CM

## 2024-05-21 DIAGNOSIS — R00.2 PALPITATIONS: ICD-10-CM

## 2024-05-21 LAB
ALBUMIN: 4.2 G/DL (ref 3.5–5.2)
ALP BLD-CCNC: 58 U/L (ref 40–129)
ALT SERPL-CCNC: 24 U/L (ref 0–40)
ANION GAP SERPL CALCULATED.3IONS-SCNC: 13 MMOL/L (ref 7–16)
AST SERPL-CCNC: 32 U/L (ref 0–39)
BASOPHILS ABSOLUTE: 0.1 K/UL (ref 0–0.2)
BASOPHILS RELATIVE PERCENT: 1 % (ref 0–2)
BILIRUB SERPL-MCNC: 0.3 MG/DL (ref 0–1.2)
BUN BLDV-MCNC: 16 MG/DL (ref 6–23)
CALCIUM SERPL-MCNC: 9.7 MG/DL (ref 8.6–10.2)
CHLORIDE BLD-SCNC: 103 MMOL/L (ref 98–107)
CO2: 25 MMOL/L (ref 22–29)
CREAT SERPL-MCNC: 1.2 MG/DL (ref 0.7–1.2)
EOSINOPHILS ABSOLUTE: 0.61 K/UL (ref 0.05–0.5)
EOSINOPHILS RELATIVE PERCENT: 5 % (ref 0–6)
GFR, ESTIMATED: 63 ML/MIN/1.73M2
GLUCOSE BLD-MCNC: 101 MG/DL (ref 74–99)
HCT VFR BLD CALC: 45.2 % (ref 37–54)
HEMOGLOBIN: 13.3 G/DL (ref 12.5–16.5)
IMMATURE GRANULOCYTES %: 0 % (ref 0–5)
IMMATURE GRANULOCYTES ABSOLUTE: 0.03 K/UL (ref 0–0.58)
LYMPHOCYTES ABSOLUTE: 2.34 K/UL (ref 1.5–4)
LYMPHOCYTES RELATIVE PERCENT: 19 % (ref 20–42)
MAGNESIUM: 2.4 MG/DL (ref 1.6–2.6)
MCH RBC QN AUTO: 23.3 PG (ref 26–35)
MCHC RBC AUTO-ENTMCNC: 29.4 G/DL (ref 32–34.5)
MCV RBC AUTO: 79.2 FL (ref 80–99.9)
MONOCYTES ABSOLUTE: 1.02 K/UL (ref 0.1–0.95)
MONOCYTES RELATIVE PERCENT: 8 % (ref 2–12)
NEUTROPHILS ABSOLUTE: 8.32 K/UL (ref 1.8–7.3)
NEUTROPHILS RELATIVE PERCENT: 67 % (ref 43–80)
PDW BLD-RTO: 20.5 % (ref 11.5–15)
PLATELET # BLD: 288 K/UL (ref 130–450)
PMV BLD AUTO: 11.6 FL (ref 7–12)
POTASSIUM SERPL-SCNC: 4.6 MMOL/L (ref 3.5–5)
RBC # BLD: 5.71 M/UL (ref 3.8–5.8)
RBC # BLD: ABNORMAL 10*6/UL
SODIUM BLD-SCNC: 141 MMOL/L (ref 132–146)
T4 FREE: 1.3 NG/DL (ref 0.9–1.7)
TOTAL PROTEIN: 7.1 G/DL (ref 6.4–8.3)
TSH SERPL DL<=0.05 MIU/L-ACNC: 1.6 UIU/ML (ref 0.27–4.2)
WBC # BLD: 12.4 K/UL (ref 4.5–11.5)

## 2024-05-21 NOTE — TELEPHONE ENCOUNTER
Spoke to DENIA Rubalcava, will get blood work and close follow up with us. Ideally I would like to have him in here next week.       He had NSR with PVC

## 2024-05-21 NOTE — TELEPHONE ENCOUNTER
FYI     Patient called in with concerns of an irregular heart rate. He went to donate blood and when the tech took his pulse, she stated that his heart was \"skipping a beat\"     Patient is concerned about this. He is a  and he had the school nurse take his pulse as well. The first time she did it, she stated she didn't hear any irregularity, the 2nd time she did state she heard a \"missed beat\"     I offered patient an appointment this week to see a resident and get an EKG. Patient declined and stated he thinks he is going to go to the walk in [facility] today just for peace of mind.     His follow up is not until August, so I scheduled him a sooner appointment with Dr Mckeon to follow up.

## 2024-05-21 NOTE — TELEPHONE ENCOUNTER
He should get an EKG today.  If we can't do this then he should see walk in/urgent care for a visit.      Remember for this EKG we are looking for the \"skip\" beat.        IF he doesn't go to urgent care offer an EKG visit.  I need to see EKG prior to him leaving.

## 2024-05-21 NOTE — TELEPHONE ENCOUNTER
When patient told me earlier that he was going to go to walk in - I did advise him at that time to make sure he gets an EKG. I am unable to reach the patient to make sure that he has us look at EKG before leaving

## 2024-05-21 NOTE — PROGRESS NOTES
(CHOLECALCIFEROL) 1000 UNITS CAPS capsule, Take 3 capsules by mouth daily, Disp: , Rfl:     Magnesium 500 MG CAPS, Take  by mouth daily., Disp: , Rfl:     Allergies:     Allergies   Allergen Reactions    Pcn [Penicillins] Rash     childhood       Social History:     Social History     Tobacco Use    Smoking status: Never    Smokeless tobacco: Never   Substance Use Topics    Alcohol use: Yes     Alcohol/week: 1.0 standard drink of alcohol     Types: 1 Shots of liquor per week     Comment: rarely    Drug use: No       Patient lives at home.    Physical Exam:     Vitals:    05/21/24 1541   BP: (!) 142/68   Site: Right Upper Arm   Position: Sitting   Pulse: 91   Temp: 98.5 °F (36.9 °C)   TempSrc: Temporal   SpO2: 97%   Weight: 87.1 kg (192 lb)       Exam:  Physical Exam  Nurses note and vital signs reviewed and patient is not hypoxic.    General: The patient appears well and in no apparent distress. Patient is resting comfortably on cart.  Skin: Warm, dry, no pallor noted. There is no rash noted.  Head: Normocephalic, atraumatic.  Eye: Normal conjunctiva  Ears, Nose, Mouth, and Throat: Oral mucosa is moist  Cardiovascular: Regular Rate and Rhythm  Respiratory: Patient is in no distress, no accessory muscle use, lungs are clear to auscultation, no wheezing, crackles or rhonchi  Back: Non-tender, no CVA tenderness bilaterally to percussion.  GI: Normal bowel sounds, no tenderness to palpation, no masses appreciated. No rebound, guarding, or rigidity noted.  Musculoskeletal: The patient has no evidence of calf tenderness, no pitting edema, symmetrical pulses noted bilaterally  Neurological: A&O x4, normal speech      Testing:     No results found for this visit on 05/21/24.        Medical Decision Making:     Vital signs reviewed    Past medical history reviewed.    Allergies reviewed.    Medications reviewed.    Patient on arrival does not appear to be in any apparent distress or discomfort.  The patient has been seen and

## 2024-05-22 NOTE — RESULT ENCOUNTER NOTE
I called and left a voicemail for the patient to return phone call regarding the laboratory studies.    He had a slight elevation white blood cell count of 12.4.  Hemoglobin hematocrit were normal, not significantly elevated.  Platelet count was normal.    CMP was within normal limits.    TSH was normal.  Free T4 was normal.    Magnesium was normal.

## 2024-06-13 ENCOUNTER — OFFICE VISIT (OUTPATIENT)
Dept: FAMILY MEDICINE CLINIC | Age: 79
End: 2024-06-13

## 2024-06-13 VITALS
SYSTOLIC BLOOD PRESSURE: 132 MMHG | OXYGEN SATURATION: 97 % | RESPIRATION RATE: 19 BRPM | WEIGHT: 191 LBS | BODY MASS INDEX: 26.74 KG/M2 | HEART RATE: 90 BPM | DIASTOLIC BLOOD PRESSURE: 68 MMHG | TEMPERATURE: 98.6 F | HEIGHT: 71 IN

## 2024-06-13 DIAGNOSIS — I49.1 PREMATURE ATRIAL BEATS: Primary | ICD-10-CM

## 2024-06-13 DIAGNOSIS — R35.1 NOCTURIA: ICD-10-CM

## 2024-06-13 RX ORDER — METOPROLOL SUCCINATE 25 MG/1
25 TABLET, EXTENDED RELEASE ORAL DAILY
Qty: 90 TABLET | Refills: 1 | Status: SHIPPED | OUTPATIENT
Start: 2024-06-13

## 2024-06-13 RX ORDER — TAMSULOSIN HYDROCHLORIDE 0.4 MG/1
0.4 CAPSULE ORAL DAILY
Qty: 30 CAPSULE | Refills: 0 | Status: SHIPPED | OUTPATIENT
Start: 2024-06-13

## 2024-06-13 ASSESSMENT — ENCOUNTER SYMPTOMS
SHORTNESS OF BREATH: 0
ABDOMINAL PAIN: 0
CHEST TIGHTNESS: 0

## 2024-06-13 NOTE — PROGRESS NOTES
[facility]  Department of Family Medicine  Family Medicine Residency Program      Patient:  Js Martinez 78 y.o. male  Date of Service: 6/13/24      Chief complaint:   Chief Complaint   Patient presents with    Palpitations     Has not had any palpations since 5/21/24  He was under a lot of stress when the palpations were occurring     Urinary Frequency     Has to urinate within 10 minutes of drinking water       Assessment and Plan   1. Premature atrial beats  Will B-block patient and have him see cardiology to determine if further w/u is needed.  I'm concerned that the rate of ectopy may be too great.  At this time he does not endorse additional sx and therefore I will defer w/u choice to cardiology.  Should he become symptomatic he will call back or go to ER.   - metoprolol succinate (TOPROL XL) 25 MG extended release tablet; Take 1 tablet by mouth daily  Dispense: 90 tablet; Refill: 1  - Gavin Bello MD, Cardiology, [place]    2. Nocturia  Will trial flomax and have him discuss with urology.  If this is not helpful and if urology does not find obstructive disease we will need to eval changing his SGLT2.       Return to Office: Return in about 6 weeks (around 7/25/2024) for heart beats - will probably want EKG. .    History ofPresent Illness   The patient is a 78 y.o. male  presented to the clinic with complaints as above.    PAC - had irregular heart beat when donating blood and he presented to urgent care with PACs.  Ectopy about 2-3 on  12 sec strip.  Electrolytes were normal including thyroid at the time.  He was asymptomatic at the time.  Today he is asymptomatic     Frequent wakings for urination 3x per night.  Follows with urology.     Review of Systems:   Review of Systems   Respiratory:  Negative for chest tightness and shortness of breath.    Cardiovascular:  Negative for chest pain and palpitations.   Gastrointestinal:  Negative for abdominal pain.       Physical

## 2024-07-09 ENCOUNTER — TELEPHONE (OUTPATIENT)
Dept: FAMILY MEDICINE CLINIC | Age: 79
End: 2024-07-09

## 2024-07-09 RX ORDER — TAMSULOSIN HYDROCHLORIDE 0.4 MG/1
CAPSULE ORAL DAILY
Qty: 30 CAPSULE | Refills: 0 | OUTPATIENT
Start: 2024-07-09

## 2024-07-09 RX ORDER — TAMSULOSIN HYDROCHLORIDE 0.4 MG/1
0.4 CAPSULE ORAL DAILY
Qty: 90 CAPSULE | Refills: 0 | Status: SHIPPED | OUTPATIENT
Start: 2024-07-09

## 2024-07-09 NOTE — TELEPHONE ENCOUNTER
Last Appointment   6/13/2024  Next Appointment  8/8/2024    Patient requesting refill of Flomax be sent to [facility] on [address]

## 2024-08-07 SDOH — HEALTH STABILITY: PHYSICAL HEALTH
ON AVERAGE, HOW MANY DAYS PER WEEK DO YOU ENGAGE IN MODERATE TO STRENUOUS EXERCISE (LIKE A BRISK WALK)?: PATIENT DECLINED

## 2024-08-07 ASSESSMENT — PATIENT HEALTH QUESTIONNAIRE - PHQ9
2. FEELING DOWN, DEPRESSED OR HOPELESS: SEVERAL DAYS
SUM OF ALL RESPONSES TO PHQ QUESTIONS 1-9: 2
1. LITTLE INTEREST OR PLEASURE IN DOING THINGS: SEVERAL DAYS
SUM OF ALL RESPONSES TO PHQ9 QUESTIONS 1 & 2: 2
SUM OF ALL RESPONSES TO PHQ QUESTIONS 1-9: 2

## 2024-08-07 ASSESSMENT — LIFESTYLE VARIABLES
HOW MANY STANDARD DRINKS CONTAINING ALCOHOL DO YOU HAVE ON A TYPICAL DAY: 1 OR 2
HOW OFTEN DO YOU HAVE A DRINK CONTAINING ALCOHOL: MONTHLY OR LESS
HOW OFTEN DO YOU HAVE SIX OR MORE DRINKS ON ONE OCCASION: 1
HOW MANY STANDARD DRINKS CONTAINING ALCOHOL DO YOU HAVE ON A TYPICAL DAY: 1
HOW OFTEN DO YOU HAVE A DRINK CONTAINING ALCOHOL: 2

## 2024-08-08 ENCOUNTER — OFFICE VISIT (OUTPATIENT)
Dept: FAMILY MEDICINE CLINIC | Age: 79
End: 2024-08-08

## 2024-08-08 VITALS
OXYGEN SATURATION: 97 % | TEMPERATURE: 98.4 F | WEIGHT: 194 LBS | HEIGHT: 71 IN | BODY MASS INDEX: 27.16 KG/M2 | DIASTOLIC BLOOD PRESSURE: 70 MMHG | HEART RATE: 90 BPM | SYSTOLIC BLOOD PRESSURE: 134 MMHG | RESPIRATION RATE: 16 BRPM

## 2024-08-08 DIAGNOSIS — R73.03 PREDIABETES: ICD-10-CM

## 2024-08-08 DIAGNOSIS — I10 ESSENTIAL HYPERTENSION: ICD-10-CM

## 2024-08-08 DIAGNOSIS — R00.8 OTHER ABNORMALITIES OF HEART BEAT: ICD-10-CM

## 2024-08-08 DIAGNOSIS — Z00.00 MEDICARE ANNUAL WELLNESS VISIT, SUBSEQUENT: Primary | ICD-10-CM

## 2024-08-08 DIAGNOSIS — R60.9 EDEMA, UNSPECIFIED TYPE: ICD-10-CM

## 2024-08-08 DIAGNOSIS — I49.1 ATRIAL ECTOPY: ICD-10-CM

## 2024-08-08 LAB — HBA1C MFR BLD: 6.2 %

## 2024-08-08 RX ORDER — OXYBUTYNIN CHLORIDE 5 MG/1
5 TABLET ORAL NIGHTLY
COMMUNITY
Start: 2024-07-03

## 2024-08-08 RX ORDER — TAMSULOSIN HYDROCHLORIDE 0.4 MG/1
0.4 CAPSULE ORAL DAILY
Qty: 90 CAPSULE | Refills: 1 | Status: SHIPPED | OUTPATIENT
Start: 2024-08-08

## 2024-08-08 NOTE — PROGRESS NOTES
Medicare Annual Wellness Visit    Js Martinez is here for Medicare AWV, Hypertension, Hyperlipidemia, and Joint Swelling (Left ankle x a few days )    Assessment & Plan   Medicare annual wellness visit, subsequent  -     [facility] Referral to [facility] Clinical Specialist  Essential hypertension  -     Echo (TTE) complete (PRN contrast/bubble/strain/3D); Future  Prediabetes  -     POCT glycosylated hemoglobin (Hb A1C)  Edema, unspecified type  -     Echo (TTE) complete (PRN contrast/bubble/strain/3D); Future  Other abnormalities of heart beat  -     Echo (TTE) complete (PRN contrast/bubble/strain/3D); Future  Atrial ectopy  -     Echo (TTE) complete (PRN contrast/bubble/strain/3D); Future    PACs resolved - will check ECHO with development of edema.  Will have cardio eval in Oct.      Recommendations for Preventive Services Due: see orders and patient instructions/AVS.  Recommended screening schedule for the next 5-10 years is provided to the patient in written form: see Patient Instructions/AVS.     Return for Medicare Annual Wellness Visit in 1 year.     Subjective   The following acute and/or chronic problems were also addressed today:  Htn - no significant sx related to BP. No SE to meds. He does have some fatigue.    Fatigue - waking only once at night (down from 4x / night with farxiga).  Feels like he sleeps well.  Wakes rested, no AM HA. No snoring.  Sleeps ~9h at night, but has been napping 2h in the afternoon.    PACs - feel fatigue with the metoprolol but has noted a reduction in ectopy.      Patient's complete Health Risk Assessment and screening values have been reviewed and are found in Flowsheets. The following problems were reviewed today and where indicated follow up appointments were made and/or referrals ordered.    Positive Risk Factor Screenings with Interventions:    Fall Risk:  Do you feel unsteady or are you worried about falling? : (!) yes  2 or more falls in past year?: no  Fall with injury in

## 2024-08-08 NOTE — PATIENT INSTRUCTIONS
preventive eye exam performed by an eye specialist is recommended every 1-2 years to screen for glaucoma; cataracts, macular degeneration, and other eye disorders.  A preventive dental visit is recommended every 6 months.  Try to get at least 150 minutes of exercise per week or 10,000 steps per day on a pedometer .  Order or download the FREE \"Exercise & Physical Activity: Your Everyday Guide\" from [employer]. Call 1-605.527.6404 or search [employer] online.  You need 6716-7843 mg of calcium and 5742-6222 IU of vitamin D per day. It is possible to meet your calcium requirement with diet alone, but a vitamin D supplement is usually necessary to meet this goal.  When exposed to the sun, use a sunscreen that protects against both UVA and UVB radiation with an SPF of 30 or greater. Reapply every 2 to 3 hours or after sweating, drying off with a towel, or swimming.  Always wear a seat belt when traveling in a car. Always wear a helmet when riding a bicycle or motorcycle.

## 2024-08-09 ENCOUNTER — CLINICAL DOCUMENTATION (OUTPATIENT)
Dept: SPIRITUAL SERVICES | Age: 79
End: 2024-08-09

## 2024-08-09 NOTE — ACP (ADVANCE CARE PLANNING)
Advance Care Planning   Ambulatory ACP Specialist Patient Outreach    Date:  8/9/2024    ACP Specialist:  Irma Ndiaye LPN    Outreach call to patient in follow-up to ACP Specialist referral from:Js Mckeon MD    [x] PCP  [] Provider   [] Ambulatory Care Management [] Other     For:                  [x] Advance Directive Assistance              [] Complete Portable DNR order              [] Complete POST/POLST/MOST              [] Code Status Discussion             [] Discuss Goals of Care             [x] Early ACP Decision-Making              [] Other (Specify)    Date Referral Received:8/8/24    Next Step:   [] ACP scheduled conversation  [x] Outreach again in one week               [] Email / Mail ACP Info Sheets  [] Email / Mail Advance Directive   [] Closing referral.  Routing closure to referring provider/staff and to ACP Specialist .    [] Closure letter mailed to patient with invitation to contact ACP Specialist if / when ready.   [] Other (Specify here):       [x] At this time, Healthcare Decision Maker Is:        [] Primary agent named in scanned advance directive.    [] Legal Next of Kin.     [x] Unable to determine legal decision maker at this time.    Outreaches:       [x] 1st -  Date:  8/9/24               Intervention:  [] Spoke with Patient   [x] Left Voice mail [] Email / Mail    [x] MyChart  [] Other (Specify) :     Outcomes:Writer attempted ACP outreach to patient's preferred number ([phone]).  Writer left voicemail requesting return call including call back number.  MyChart message sent.  ACP outreach will be attempted in about one week if return call not received.             [] 2nd -  Date:                 Intervention:  [] Spoke with Patient  [] Left Voice mail [] Email / Mail    [] MyChart  [] Other (Specify) :              Outcomes:                [] 3rd -  Date:                Intervention:  [] Spoke with Patient   [] Left Voice mail [] Email / Mail    []

## 2024-09-05 ENCOUNTER — PATIENT MESSAGE (OUTPATIENT)
Dept: SPIRITUAL SERVICES | Age: 79
End: 2024-09-05

## 2024-09-06 ENCOUNTER — CLINICAL DOCUMENTATION (OUTPATIENT)
Dept: SPIRITUAL SERVICES | Age: 79
End: 2024-09-06

## 2024-09-06 NOTE — ACP (ADVANCE CARE PLANNING)
Advance Care Planning   Ambulatory ACP Specialist Patient Outreach    Date:  9/6/2024    ACP Specialist:  JEROME Garcia    Outreach call to patient in follow-up to ACP Specialist referral from:Js Mckeon MD    [x] PCP  [] Provider   [] Ambulatory Care Management [] Other     For:                  [x] Advance Directive Assistance              [] Complete Portable DNR order              [] Complete POST/POLST/MOST              [] Code Status Discussion             [] Discuss Goals of Care             [x] Early ACP Decision-Making              [] Other (Specify)    Date Referral Received:08/08/2024    Next Step:   [x] ACP scheduled conversation  [] Outreach again in one week               [] Email / Mail ACP Info Sheets  [] Email / Mail Advance Directive   [] Closing referral.  Routing closure to referring provider/staff and to ACP Specialist .    [] Closure letter mailed to patient with invitation to contact ACP Specialist if / when ready.   [] Other (Specify here):       [x] At this time, Healthcare Decision Maker Is:         [] Primary agent named in scanned advance directive.    [x] Legal Next of Kin.     [] Unable to determine legal decision maker at this time.    Outreaches:       [x]  Additional Outreach -  Date:  09/06/2024   (Specify Dates & special circumstances): ACP visit     Outcomes: Placed call to home/preferred number for today's ACP visit. Pt answered and shared that his sister has asked to be a part of the ACP visit. Sister not available today. We rescheduled for Tues Sept 24 at 1pm.     Offered supportive listening as pt shared some of his life experiences and some of his fears around ACP. Offered reassurance and answered questions. Will continue to offer ACP support and education.     Thank you for this referral.

## 2024-09-23 ENCOUNTER — TELEPHONE (OUTPATIENT)
Dept: CARDIOLOGY | Age: 79
End: 2024-09-23

## 2024-09-24 ENCOUNTER — CLINICAL DOCUMENTATION (OUTPATIENT)
Dept: SPIRITUAL SERVICES | Age: 79
End: 2024-09-24

## 2024-09-24 ENCOUNTER — HOSPITAL ENCOUNTER (OUTPATIENT)
Dept: CARDIOLOGY | Age: 79
Discharge: HOME | End: 2024-09-26
Attending: FAMILY MEDICINE
Payer: MEDICARE

## 2024-09-24 VITALS — WEIGHT: 194 LBS | BODY MASS INDEX: 27.16 KG/M2 | HEIGHT: 71 IN

## 2024-09-24 DIAGNOSIS — I10 ESSENTIAL HYPERTENSION: ICD-10-CM

## 2024-09-24 DIAGNOSIS — R60.9 EDEMA, UNSPECIFIED TYPE: ICD-10-CM

## 2024-09-24 DIAGNOSIS — R00.8 OTHER ABNORMALITIES OF HEART BEAT: ICD-10-CM

## 2024-09-24 DIAGNOSIS — I49.1 ATRIAL ECTOPY: ICD-10-CM

## 2024-09-24 PROCEDURE — 93306 TTE W/DOPPLER COMPLETE: CPT

## 2024-09-25 LAB
ECHO AO ASC DIAM: 3.4 CM
ECHO AO ASCENDING AORTA INDEX: 1.63 CM/M2
ECHO AV AREA PEAK VELOCITY: 2.9 CM2
ECHO AV AREA VTI: 2.9 CM2
ECHO AV AREA/BSA PEAK VELOCITY: 1.4 CM2/M2
ECHO AV AREA/BSA VTI: 1.4 CM2/M2
ECHO AV CUSP MM: 2.4 CM
ECHO AV MEAN GRADIENT: 5 MMHG
ECHO AV MEAN VELOCITY: 1.1 M/S
ECHO AV PEAK GRADIENT: 8 MMHG
ECHO AV PEAK VELOCITY: 1.4 M/S
ECHO AV VELOCITY RATIO: 0.71
ECHO AV VTI: 33.9 CM
ECHO BSA: 2.1 M2
ECHO EST RA PRESSURE: 3 MMHG
ECHO LA DIAMETER INDEX: 1.97 CM/M2
ECHO LA DIAMETER: 4.1 CM
ECHO LA VOL A-L A2C: 51 ML (ref 18–58)
ECHO LA VOL A-L A4C: 53 ML (ref 18–58)
ECHO LA VOL MOD A2C: 48 ML (ref 18–58)
ECHO LA VOL MOD A4C: 48 ML (ref 18–58)
ECHO LA VOLUME AREA LENGTH: 53 ML
ECHO LA VOLUME INDEX A-L A2C: 25 ML/M2 (ref 16–34)
ECHO LA VOLUME INDEX A-L A4C: 25 ML/M2 (ref 16–34)
ECHO LA VOLUME INDEX AREA LENGTH: 25 ML/M2 (ref 16–34)
ECHO LA VOLUME INDEX MOD A2C: 23 ML/M2 (ref 16–34)
ECHO LA VOLUME INDEX MOD A4C: 23 ML/M2 (ref 16–34)
ECHO LV EF PHYSICIAN: 55 %
ECHO LV FRACTIONAL SHORTENING: 27 % (ref 28–44)
ECHO LV INTERNAL DIMENSION DIASTOLE INDEX: 2.31 CM/M2
ECHO LV INTERNAL DIMENSION DIASTOLIC: 4.8 CM (ref 4.2–5.9)
ECHO LV INTERNAL DIMENSION SYSTOLIC INDEX: 1.68 CM/M2
ECHO LV INTERNAL DIMENSION SYSTOLIC: 3.5 CM
ECHO LV IVSD: 1.2 CM (ref 0.6–1)
ECHO LV IVSS: 1.7 CM
ECHO LV MASS 2D: 194 G (ref 88–224)
ECHO LV MASS INDEX 2D: 93.3 G/M2 (ref 49–115)
ECHO LV POSTERIOR WALL DIASTOLIC: 1 CM (ref 0.6–1)
ECHO LV POSTERIOR WALL SYSTOLIC: 1.5 CM
ECHO LV RELATIVE WALL THICKNESS RATIO: 0.42
ECHO LVOT AREA: 4.2 CM2
ECHO LVOT AV VTI INDEX: 0.7
ECHO LVOT DIAM: 2.3 CM
ECHO LVOT MEAN GRADIENT: 2 MMHG
ECHO LVOT PEAK GRADIENT: 4 MMHG
ECHO LVOT PEAK VELOCITY: 1 M/S
ECHO LVOT STROKE VOLUME INDEX: 47.1 ML/M2
ECHO LVOT SV: 98 ML
ECHO LVOT VTI: 23.6 CM
ECHO MV "A" WAVE DURATION: 110.7 MSEC
ECHO MV A VELOCITY: 0.85 M/S
ECHO MV AREA PHT: 5 CM2
ECHO MV AREA VTI: 4.1 CM2
ECHO MV E DECELERATION TIME (DT): 214.7 MS
ECHO MV E VELOCITY: 0.88 M/S
ECHO MV E/A RATIO: 1.04
ECHO MV LVOT VTI INDEX: 1
ECHO MV MAX VELOCITY: 1 M/S
ECHO MV MEAN GRADIENT: 2 MMHG
ECHO MV MEAN VELOCITY: 0.8 M/S
ECHO MV PEAK GRADIENT: 4 MMHG
ECHO MV PRESSURE HALF TIME (PHT): 43.6 MS
ECHO MV VTI: 23.7 CM
ECHO PV MAX VELOCITY: 0.9 M/S
ECHO PV MEAN GRADIENT: 2 MMHG
ECHO PV MEAN VELOCITY: 0.7 M/S
ECHO PV PEAK GRADIENT: 4 MMHG
ECHO PV VTI: 18.4 CM
ECHO PVEIN A DURATION: 133.8 MS
ECHO PVEIN A VELOCITY: 0.3 M/S
ECHO PVEIN PEAK D VELOCITY: 0.4 M/S
ECHO PVEIN PEAK S VELOCITY: 0.6 M/S
ECHO PVEIN S/D RATIO: 1.5
ECHO RIGHT VENTRICULAR SYSTOLIC PRESSURE (RVSP): 33 MMHG
ECHO RV INTERNAL DIMENSION: 3.1 CM
ECHO RV TAPSE: 2.3 CM (ref 1.7–?)
ECHO TV REGURGITANT MAX VELOCITY: 2.72 M/S
ECHO TV REGURGITANT PEAK GRADIENT: 30 MMHG

## 2024-10-23 ENCOUNTER — OFFICE VISIT (OUTPATIENT)
Dept: CARDIOLOGY CLINIC | Age: 79
End: 2024-10-23

## 2024-10-23 VITALS
SYSTOLIC BLOOD PRESSURE: 126 MMHG | WEIGHT: 195 LBS | RESPIRATION RATE: 18 BRPM | HEIGHT: 71 IN | BODY MASS INDEX: 27.3 KG/M2 | DIASTOLIC BLOOD PRESSURE: 64 MMHG | HEART RATE: 86 BPM

## 2024-10-23 DIAGNOSIS — I25.10 CORONARY ARTERY DISEASE INVOLVING NATIVE CORONARY ARTERY OF NATIVE HEART WITHOUT ANGINA PECTORIS: ICD-10-CM

## 2024-10-23 DIAGNOSIS — I49.1 ATRIAL ECTOPY: ICD-10-CM

## 2024-10-23 DIAGNOSIS — E78.2 MIXED HYPERLIPIDEMIA: ICD-10-CM

## 2024-10-23 DIAGNOSIS — I49.3 PVC (PREMATURE VENTRICULAR CONTRACTION): Primary | ICD-10-CM

## 2024-10-23 DIAGNOSIS — I10 ESSENTIAL HYPERTENSION: ICD-10-CM

## 2024-10-23 RX ORDER — TESTOSTERONE 20.25 MG/1.25G
GEL TOPICAL
COMMUNITY

## 2024-10-23 RX ORDER — LEVOTHYROXINE SODIUM 88 UG/1
88 TABLET ORAL DAILY
COMMUNITY

## 2024-10-23 NOTE — PROGRESS NOTES
OUTPATIENT CARDIOLOGY CONSULT    Name: Js Martinez    Age: 79 y.o.    Date of Service: 10/23/2024    Reason for Consultation: PVC's    Referring Physician: Js Mckeon MD    History of Present Illness:  Js Martinez is a 79 y.o. male who presents today for further evaluation of PVC's. His PMH is outlined in detail below (see A/P below). He is routinely active with no complaints of chest pain, SOB, palpitations, lightheadedness, dizziness, or syncope. No history of PND or orthopnea. He is currently with no active cardiac complaints at rest. SR on EKG. He was noted to have an occasional irregular heart beat when he was donating blood in 2024 (asymptomatic at that time). No active cardiac complaints. His wife passed away from lung cancer. 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      Review of Systems:  Cardiac: As per HPI  General: No fever, chills  Pulmonary: As per HPI  HEENT: No visual disturbances, difficult swallowing  GI: No nausea, vomiting  : No dysuria, hematuria  Endocrine: +hypothyroidism, Hgb A1c 6.2  Musculoskeletal: HUERTA x 4, no focal motor deficits  Skin: Intact, no rashes  Neuro: No headache, seizures  Psych: Currently with no depression, anxiety    Past Medical History:  Past Medical History:   Diagnosis Date    Family history of renal cell carcinoma     Hypogonadism in male     Hypothyroidism     Legionellosis (HCC)     Right foot pain 2023    broken - followed Dr Le    Vitamin D deficiency        Past Surgical History:  Past Surgical History:   Procedure Laterality Date    CARPAL TUNNEL RELEASE Bilateral 2009    CATARACT REMOVAL WITH IMPLANT Right     CATARACT REMOVAL WITH IMPLANT Left 2021    COLONOSCOPY  2016    Dr. Saldana       Family History:  Family History   Problem Relation Age of Onset    Cancer Brother             Breast Cancer Sister         currently

## 2024-10-28 ENCOUNTER — TELEPHONE (OUTPATIENT)
Dept: CARDIOLOGY | Age: 79
End: 2024-10-28

## 2024-10-28 NOTE — TELEPHONE ENCOUNTER
Left message to schedule exercise nuclear stress test.  Electronically signed by Darcy Izaguirre on 10/28/2024 at 2:04 PM

## 2024-11-05 ENCOUNTER — TELEPHONE (OUTPATIENT)
Dept: CARDIOLOGY | Age: 79
End: 2024-11-05

## 2024-11-05 NOTE — TELEPHONE ENCOUNTER
Left message on voice mail to remind patient of nuclear stress test appointment on November 7, 2024 at 0700. Instructions for test,current medication list, hold metoprolol 24 hours, bring albuterol inhaler to appointment, and COVID-19 preprocedure information left on voice mail. Asked patient to call with any questions or if unable to keep appointment.

## 2024-11-07 ENCOUNTER — HOSPITAL ENCOUNTER (OUTPATIENT)
Dept: CARDIOLOGY | Age: 79
Discharge: HOME | End: 2024-11-09
Attending: INTERNAL MEDICINE
Payer: MEDICARE

## 2024-11-07 VITALS
SYSTOLIC BLOOD PRESSURE: 112 MMHG | HEART RATE: 90 BPM | HEIGHT: 71 IN | DIASTOLIC BLOOD PRESSURE: 70 MMHG | RESPIRATION RATE: 12 BRPM | BODY MASS INDEX: 27.3 KG/M2 | WEIGHT: 195 LBS

## 2024-11-07 DIAGNOSIS — I10 ESSENTIAL HYPERTENSION: ICD-10-CM

## 2024-11-07 DIAGNOSIS — I25.10 CORONARY ARTERY DISEASE INVOLVING NATIVE CORONARY ARTERY OF NATIVE HEART WITHOUT ANGINA PECTORIS: ICD-10-CM

## 2024-11-07 LAB
ECHO BSA: 2.1 M2
NUC STRESS EJECTION FRACTION: 58 %
STRESS BASELINE DIAS BP: 70 MMHG
STRESS BASELINE HR: 85 BPM
STRESS BASELINE SYS BP: 112 MMHG
STRESS ESTIMATED WORKLOAD: 8.5 METS
STRESS EXERCISE DUR MIN: 6 MIN
STRESS EXERCISE DUR SEC: 31 SEC
STRESS PEAK DIAS BP: 70 MMHG
STRESS PEAK SYS BP: 114 MMHG
STRESS PERCENT HR ACHIEVED: 96 %
STRESS POST PEAK HR: 137 BPM
STRESS RATE PRESSURE PRODUCT: NORMAL BPM*MMHG
STRESS TARGET HR: 143 BPM

## 2024-11-07 PROCEDURE — A9500 TC99M SESTAMIBI: HCPCS | Performed by: INTERNAL MEDICINE

## 2024-11-07 PROCEDURE — 93017 CV STRESS TEST TRACING ONLY: CPT

## 2024-11-07 PROCEDURE — 3430000000 HC RX DIAGNOSTIC RADIOPHARMACEUTICAL: Performed by: INTERNAL MEDICINE

## 2024-11-07 PROCEDURE — 2580000003 HC RX 258: Performed by: INTERNAL MEDICINE

## 2024-11-07 RX ORDER — TETRAKIS(2-METHOXYISOBUTYLISOCYANIDE)COPPER(I) TETRAFLUOROBORATE 1 MG/ML
8.2 INJECTION, POWDER, LYOPHILIZED, FOR SOLUTION INTRAVENOUS
Status: COMPLETED | OUTPATIENT
Start: 2024-11-07 | End: 2024-11-07

## 2024-11-07 RX ORDER — SODIUM CHLORIDE 0.9 % (FLUSH) 0.9 %
10 SYRINGE (ML) INJECTION PRN
Status: DISCONTINUED | OUTPATIENT
Start: 2024-11-07 | End: 2024-11-10 | Stop reason: HOSPADM

## 2024-11-07 RX ORDER — TETRAKIS(2-METHOXYISOBUTYLISOCYANIDE)COPPER(I) TETRAFLUOROBORATE 1 MG/ML
26.1 INJECTION, POWDER, LYOPHILIZED, FOR SOLUTION INTRAVENOUS
Status: COMPLETED | OUTPATIENT
Start: 2024-11-07 | End: 2024-11-07

## 2024-11-07 RX ADMIN — SODIUM CHLORIDE, PRESERVATIVE FREE 10 ML: 5 INJECTION INTRAVENOUS at 08:13

## 2024-11-07 RX ADMIN — Medication 8.2 MILLICURIE: at 07:06

## 2024-11-07 RX ADMIN — Medication 26.1 MILLICURIE: at 08:13

## 2024-11-07 RX ADMIN — SODIUM CHLORIDE, PRESERVATIVE FREE 10 ML: 5 INJECTION INTRAVENOUS at 07:05

## 2024-11-11 ENCOUNTER — TELEPHONE (OUTPATIENT)
Dept: FAMILY MEDICINE CLINIC | Age: 79
End: 2024-11-11

## 2024-11-11 DIAGNOSIS — I49.1 PREMATURE ATRIAL BEATS: ICD-10-CM

## 2024-11-11 RX ORDER — METOPROLOL SUCCINATE 25 MG/1
25 TABLET, EXTENDED RELEASE ORAL DAILY
Qty: 90 TABLET | Refills: 3 | Status: SHIPPED | OUTPATIENT
Start: 2024-11-11

## 2024-11-11 NOTE — TELEPHONE ENCOUNTER
Name of Medication(s) Requested:  Requested Prescriptions     Pending Prescriptions Disp Refills    metoprolol succinate (TOPROL XL) 25 MG extended release tablet 90 tablet 3     Sig: Take 1 tablet by mouth daily       Medication is on current medication list Yes    Dosage and directions were verified? Yes    Quantity verified: 90 day supply     Pharmacy Verified?  Yes    Last Appointment:  8/8/2024    Future appts:  Future Appointments   Date Time Provider Department Center   8/14/2025  9:00 AM Js Mckeon MD [facility] DEP        (If no appt send self scheduling link. .REFILLAPPT)  Scheduling request sent?     [] Yes  [x] No    Does patient need updated?  [] Yes  [x] No

## 2024-11-11 NOTE — TELEPHONE ENCOUNTER
Last Appointment   8/8/2024  Next Appointment  Visit date not found    Patient requesting refill of Metoprolol. He would like this sent to [facility].

## 2024-11-13 NOTE — TELEPHONE ENCOUNTER
Last Appointment   8/8/2024  Next Appointment  2/20/2025  Patient called back in regarding message to schedule in 6 months, he is very upset that he has to come back in 6 months when he was here he was told a year and now all of sudden he has to come back in 6 months after asking for a refill? He also is frustrated because no one has called him about his stress results.

## 2024-11-13 NOTE — TELEPHONE ENCOUNTER
Based on his health conditions 6 to 12 months for recheck is appropriate.  I will refill for 12 however I prefer slightly earlier.    His stress test was ordered by cardiology therefore I defer this issue to their office.  I do not see any problems with this test on my review.

## 2024-11-14 ENCOUNTER — TELEPHONE (OUTPATIENT)
Dept: FAMILY MEDICINE CLINIC | Age: 79
End: 2024-11-14

## 2024-11-14 NOTE — TELEPHONE ENCOUNTER
Pt calling and would like to know if PCP recommends he get the RSV Vaccine?    Please advise: call pt back @ [phone]

## 2024-11-29 ENCOUNTER — TELEPHONE (OUTPATIENT)
Dept: CARDIOLOGY CLINIC | Age: 79
End: 2024-11-29

## 2024-11-29 NOTE — TELEPHONE ENCOUNTER
----- Message from Alida KINCAID sent at 11/29/2024 11:34 AM EST -----    ----- Message -----  From: Gavin Morales MD  Sent: 11/28/2024   3:06 PM EST  To: Diana Bartholomew MA    Negative stress test and normal EF

## 2025-01-27 DIAGNOSIS — I10 ESSENTIAL HYPERTENSION: ICD-10-CM

## 2025-01-28 RX ORDER — LOSARTAN POTASSIUM 50 MG/1
50 TABLET ORAL DAILY
Qty: 90 TABLET | Refills: 3 | Status: SHIPPED | OUTPATIENT
Start: 2025-01-28

## 2025-01-28 RX ORDER — LEVOTHYROXINE SODIUM 88 MCG
88 TABLET ORAL DAILY
Qty: 90 TABLET | Refills: 3 | Status: SHIPPED | OUTPATIENT
Start: 2025-01-28

## 2025-01-28 NOTE — TELEPHONE ENCOUNTER
Name of Medication(s) Requested:  Requested Prescriptions     Pending Prescriptions Disp Refills    losartan (COZAAR) 50 MG tablet [Pharmacy Med Name: LOSARTAN POT TAB 50MG] 90 tablet 3     Sig: TAKE 1 TABLET DAILY    SYNTHROID 88 MCG tablet [Pharmacy Med Name: SYNTHROID TAB 88MCG] 90 tablet 3     Sig: TAKE 1 TABLET DAILY       Medication is on current medication list Yes    Dosage and directions were verified? Yes    Quantity verified: 90 day supply     Pharmacy Verified?  Yes    Last Appointment:  8/8/2024    Future appts:  Future Appointments   Date Time Provider Department Center   2/20/2025 10:00 AM Js Mckeon MD [facility] DEP   8/18/2025  9:00 AM Js Mckeon MD [facility] DEP        (If no appt send self scheduling link. .REFILLAPPT)  Scheduling request sent?     [] Yes  [x] No    Does patient need updated?  [] Yes  [x] No

## 2025-02-17 SDOH — ECONOMIC STABILITY: INCOME INSECURITY: IN THE LAST 12 MONTHS, WAS THERE A TIME WHEN YOU WERE NOT ABLE TO PAY THE MORTGAGE OR RENT ON TIME?: NO

## 2025-02-17 SDOH — ECONOMIC STABILITY: FOOD INSECURITY: WITHIN THE PAST 12 MONTHS, YOU WORRIED THAT YOUR FOOD WOULD RUN OUT BEFORE YOU GOT MONEY TO BUY MORE.: NEVER TRUE

## 2025-02-17 SDOH — ECONOMIC STABILITY: FOOD INSECURITY: WITHIN THE PAST 12 MONTHS, THE FOOD YOU BOUGHT JUST DIDN'T LAST AND YOU DIDN'T HAVE MONEY TO GET MORE.: NEVER TRUE

## 2025-02-17 ASSESSMENT — PATIENT HEALTH QUESTIONNAIRE - PHQ9
2. FEELING DOWN, DEPRESSED OR HOPELESS: SEVERAL DAYS
SUM OF ALL RESPONSES TO PHQ QUESTIONS 1-9: 2
2. FEELING DOWN, DEPRESSED OR HOPELESS: SEVERAL DAYS
1. LITTLE INTEREST OR PLEASURE IN DOING THINGS: SEVERAL DAYS
SUM OF ALL RESPONSES TO PHQ QUESTIONS 1-9: 2
1. LITTLE INTEREST OR PLEASURE IN DOING THINGS: SEVERAL DAYS
SUM OF ALL RESPONSES TO PHQ QUESTIONS 1-9: 2
SUM OF ALL RESPONSES TO PHQ9 QUESTIONS 1 & 2: 2
SUM OF ALL RESPONSES TO PHQ9 QUESTIONS 1 & 2: 2
SUM OF ALL RESPONSES TO PHQ QUESTIONS 1-9: 2

## 2025-02-20 ENCOUNTER — OFFICE VISIT (OUTPATIENT)
Dept: FAMILY MEDICINE CLINIC | Age: 80
End: 2025-02-20

## 2025-02-20 VITALS
TEMPERATURE: 97.7 F | OXYGEN SATURATION: 95 % | BODY MASS INDEX: 26.95 KG/M2 | HEART RATE: 89 BPM | SYSTOLIC BLOOD PRESSURE: 134 MMHG | HEIGHT: 71 IN | WEIGHT: 192.5 LBS | DIASTOLIC BLOOD PRESSURE: 62 MMHG | RESPIRATION RATE: 20 BRPM

## 2025-02-20 DIAGNOSIS — I10 ESSENTIAL HYPERTENSION: ICD-10-CM

## 2025-02-20 DIAGNOSIS — F90.0 ATTENTION DEFICIT HYPERACTIVITY DISORDER (ADHD), PREDOMINANTLY INATTENTIVE TYPE: ICD-10-CM

## 2025-02-20 DIAGNOSIS — K21.00 GASTROESOPHAGEAL REFLUX DISEASE WITH ESOPHAGITIS, UNSPECIFIED WHETHER HEMORRHAGE: ICD-10-CM

## 2025-02-20 DIAGNOSIS — R60.9 DEPENDENT EDEMA: ICD-10-CM

## 2025-02-20 DIAGNOSIS — Z11.59 NEED FOR HEPATITIS C SCREENING TEST: ICD-10-CM

## 2025-02-20 DIAGNOSIS — Z83.3 FHX: DIABETES MELLITUS: ICD-10-CM

## 2025-02-20 DIAGNOSIS — E78.2 MIXED HYPERLIPIDEMIA: ICD-10-CM

## 2025-02-20 DIAGNOSIS — Z00.00 MEDICARE ANNUAL WELLNESS VISIT, SUBSEQUENT: Primary | ICD-10-CM

## 2025-02-20 RX ORDER — FUROSEMIDE 20 MG/1
20 TABLET ORAL DAILY
Qty: 90 TABLET | Refills: 0 | Status: SHIPPED | OUTPATIENT
Start: 2025-02-20

## 2025-02-20 RX ORDER — TAMSULOSIN HYDROCHLORIDE 0.4 MG/1
0.4 CAPSULE ORAL DAILY
Qty: 90 CAPSULE | Refills: 3 | Status: SHIPPED | OUTPATIENT
Start: 2025-02-20

## 2025-02-20 RX ORDER — ATOMOXETINE 40 MG/1
40 CAPSULE ORAL 2 TIMES DAILY
Qty: 180 CAPSULE | Refills: 3 | Status: SHIPPED | OUTPATIENT
Start: 2025-02-20

## 2025-02-20 RX ORDER — PANTOPRAZOLE SODIUM 40 MG/1
TABLET, DELAYED RELEASE ORAL
Qty: 36 TABLET | Refills: 3 | Status: SHIPPED | OUTPATIENT
Start: 2025-02-20

## 2025-02-20 RX ORDER — ROSUVASTATIN CALCIUM 40 MG/1
40 TABLET, COATED ORAL DAILY
Qty: 90 TABLET | Refills: 3 | Status: SHIPPED | OUTPATIENT
Start: 2025-02-20

## 2025-02-20 NOTE — PROGRESS NOTES
Medicare Annual Wellness Visit    Js Martinez is here for Medicare AWV, Eye Problem (Right eye droop), Fatigue (Reports constant fatigue), Dry Mouth, and Edema (Left leg)    Assessment & Plan   Medicare annual wellness visit, subsequent  Attention deficit hyperactivity disorder (ADHD), predominantly inattentive type  -     atomoxetine (STRATTERA) 40 MG capsule; Take 1 capsule by mouth 2 times daily, Disp-180 capsule, R-3Normal  Reflux esophagitis  -     pantoprazole (PROTONIX) 40 MG tablet; Take one tablet 3 times weekly, Disp-36 tablet, R-3Normal  Mixed hyperlipidemia  -     rosuvastatin (CRESTOR) 40 MG tablet; Take 1 tablet by mouth daily, Disp-90 tablet, R-3Normal  -     Lipid, Fasting; Future  FHx: diabetes mellitus  Essential hypertension  -     CBC; Future  -     Comprehensive Metabolic Panel; Future  -     TSH; Future  Dependent edema  -     furosemide (LASIX) 20 MG tablet; Take 1 tablet by mouth daily, Disp-90 tablet, R-0Normal  Need for hepatitis C screening test  -     Hepatitis C Antibody; Future      Will offer lasix PRN maybe once or twice a week for days when swelling is worse.  Will watch swelling and notify if changing.     Dry Mouth - will consider holding ditropan for a week over spring break and will call urology if this resolved the issue.        Return in 6 months (on 8/20/2025) for Medicare Annual Wellness Visit in 1 year.     Subjective   The following acute and/or chronic problems were also addressed today:  Drooping R upper eyelid - has eye appt later this year.   L leg swelling - b/l L>R edema.  Present for a long time. Not painful.  Worsens throughout the day. Does eat salads, not much other added salt.     Moods - decreased somewhat - has a big social factor to this with what friends are going through. We discussed counseling.   Dry mouth - will try mouth rinse.     Patient's complete Health Risk Assessment and screening values have been reviewed and are found in Flowsheets. The

## 2025-03-03 ENCOUNTER — TELEPHONE (OUTPATIENT)
Dept: FAMILY MEDICINE CLINIC | Age: 80
End: 2025-03-03

## 2025-03-03 NOTE — TELEPHONE ENCOUNTER
OTC I usually recommend immodium for the \"usual\" viral syndromes that come up from time to time.  Even prescription I don't have a lot that is better.      If this continues for up to a week then we should eval.  If he feels he is not able to keep up with hydration or if lightheaded sx develop we should eval.       When diarrhea is ongoing he probably needs to hold OFF lasix.    If BP is down and hydration is a concern holding off losartan can also be ok temporarily. I usually make this judgement on hydration status and BP.

## 2025-03-03 NOTE — TELEPHONE ENCOUNTER
Patient calling for recommendations for diarrhea ongoing since Friday, 2/28. He states he has been having bouts every couple hours. Stool is watery and brown in color. He has been staying well hydrated and trying a BRAT diet since symptoms began. He started Imodium yesterday, taking it 1-2 times per day and is drinking Electrolyte Max which seems to be helping a little. No recent medication changes. I offered to make him an appointment for a proper evaluation, but he declined stating he would prefer not to come in if it can be avoided.

## 2025-07-22 DIAGNOSIS — I10 ESSENTIAL HYPERTENSION: ICD-10-CM

## 2025-07-22 DIAGNOSIS — E78.2 MIXED HYPERLIPIDEMIA: ICD-10-CM

## 2025-07-22 DIAGNOSIS — Z11.59 NEED FOR HEPATITIS C SCREENING TEST: ICD-10-CM

## 2025-07-22 LAB
ALBUMIN: 4.1 G/DL (ref 3.5–5.2)
ALP BLD-CCNC: 58 U/L (ref 40–129)
ALT SERPL-CCNC: 46 U/L (ref 0–50)
ANION GAP SERPL CALCULATED.3IONS-SCNC: 10 MMOL/L (ref 7–16)
AST SERPL-CCNC: 35 U/L (ref 0–50)
BILIRUB SERPL-MCNC: 0.3 MG/DL (ref 0–1.2)
BUN BLDV-MCNC: 16 MG/DL (ref 8–23)
CALCIUM SERPL-MCNC: 9.6 MG/DL (ref 8.8–10.2)
CHLORIDE BLD-SCNC: 103 MMOL/L (ref 98–107)
CHOLESTEROL, FASTING: 127 MG/DL
CO2: 28 MMOL/L (ref 22–29)
CREAT SERPL-MCNC: 1.2 MG/DL (ref 0.7–1.2)
GFR, ESTIMATED: 64 ML/MIN/1.73M2
GLUCOSE BLD-MCNC: 78 MG/DL (ref 74–99)
HDLC SERPL-MCNC: 41 MG/DL
HEPATITIS C ANTIBODY: NONREACTIVE
LDL CHOLESTEROL: 72 MG/DL
POTASSIUM SERPL-SCNC: 4.7 MMOL/L (ref 3.5–5.1)
SODIUM BLD-SCNC: 142 MMOL/L (ref 136–145)
TOTAL PROTEIN: 6.9 G/DL (ref 6.4–8.3)
TRIGLYCERIDE, FASTING: 72 MG/DL
TSH SERPL DL<=0.05 MIU/L-ACNC: 2.57 UIU/ML (ref 0.27–4.2)
VLDLC SERPL CALC-MCNC: 14 MG/DL

## 2025-08-07 ENCOUNTER — OFFICE VISIT (OUTPATIENT)
Dept: FAMILY MEDICINE CLINIC | Age: 80
End: 2025-08-07

## 2025-08-07 VITALS
BODY MASS INDEX: 27.02 KG/M2 | WEIGHT: 193 LBS | TEMPERATURE: 97.8 F | RESPIRATION RATE: 20 BRPM | HEIGHT: 71 IN | DIASTOLIC BLOOD PRESSURE: 73 MMHG | HEART RATE: 84 BPM | SYSTOLIC BLOOD PRESSURE: 132 MMHG | OXYGEN SATURATION: 94 %

## 2025-08-07 DIAGNOSIS — I10 ESSENTIAL HYPERTENSION: Primary | ICD-10-CM

## 2025-08-07 DIAGNOSIS — E78.5 HYPERLIPIDEMIA, UNSPECIFIED HYPERLIPIDEMIA TYPE: ICD-10-CM

## 2025-08-07 DIAGNOSIS — K21.00 GASTROESOPHAGEAL REFLUX DISEASE WITH ESOPHAGITIS, UNSPECIFIED WHETHER HEMORRHAGE: ICD-10-CM

## 2025-08-07 DIAGNOSIS — R73.03 PREDIABETES: ICD-10-CM

## 2025-08-07 DIAGNOSIS — E03.9 ACQUIRED HYPOTHYROIDISM: ICD-10-CM

## 2025-08-17 DIAGNOSIS — R60.9 DEPENDENT EDEMA: ICD-10-CM

## 2025-08-17 PROBLEM — E78.5 HYPERLIPIDEMIA: Status: ACTIVE | Noted: 2018-07-20

## 2025-08-17 PROBLEM — R73.03 PREDIABETES: Status: ACTIVE | Noted: 2025-08-17

## 2025-08-18 RX ORDER — FUROSEMIDE 20 MG/1
20 TABLET ORAL DAILY
Qty: 90 TABLET | Refills: 1 | Status: SHIPPED | OUTPATIENT
Start: 2025-08-18